# Patient Record
Sex: MALE | Race: WHITE | NOT HISPANIC OR LATINO | Employment: FULL TIME | URBAN - METROPOLITAN AREA
[De-identification: names, ages, dates, MRNs, and addresses within clinical notes are randomized per-mention and may not be internally consistent; named-entity substitution may affect disease eponyms.]

---

## 2023-09-17 PROBLEM — E11.41: Status: ACTIVE | Noted: 2023-09-17

## 2023-09-17 PROBLEM — E55.9 VITAMIN D DEFICIENCY: Status: ACTIVE | Noted: 2023-09-17

## 2023-09-17 PROBLEM — F32.A DEPRESSED: Status: ACTIVE | Noted: 2023-09-17

## 2023-09-17 PROBLEM — G62.9 NEUROPATHY: Status: ACTIVE | Noted: 2023-09-17

## 2023-09-17 PROBLEM — M79.643 HAND PAIN: Status: ACTIVE | Noted: 2023-09-17

## 2023-09-17 PROBLEM — H43.10 VITREOUS HEMORRHAGE (MULTI): Status: ACTIVE | Noted: 2023-09-17

## 2023-09-17 PROBLEM — S00.81XA ABRASION OF FACE: Status: ACTIVE | Noted: 2023-09-17

## 2023-09-17 PROBLEM — G47.9 DIFFICULTY SLEEPING: Status: ACTIVE | Noted: 2023-09-17

## 2023-09-17 PROBLEM — I25.10 ARTERIOSCLEROSIS OF CORONARY ARTERY: Status: ACTIVE | Noted: 2023-09-17

## 2023-09-17 PROBLEM — R60.9 EDEMA: Status: ACTIVE | Noted: 2023-09-17

## 2023-09-17 PROBLEM — S09.90XA INJURY OF HEAD: Status: ACTIVE | Noted: 2023-09-17

## 2023-09-17 PROBLEM — E78.00 HYPERCHOLESTEREMIA: Status: ACTIVE | Noted: 2023-09-17

## 2023-09-17 PROBLEM — J45.909 ASTHMA (HHS-HCC): Status: ACTIVE | Noted: 2023-09-17

## 2023-09-17 PROBLEM — M25.539 PAIN IN WRIST: Status: ACTIVE | Noted: 2023-09-17

## 2023-09-17 PROBLEM — H54.40 BLINDNESS OF LEFT EYE: Status: ACTIVE | Noted: 2023-09-17

## 2023-09-17 PROBLEM — L40.9 PSORIASIS: Status: ACTIVE | Noted: 2023-09-17

## 2023-09-17 PROBLEM — Z95.1 HISTORY OF CORONARY ARTERY BYPASS GRAFT: Status: ACTIVE | Noted: 2023-09-17

## 2023-09-17 PROBLEM — M79.602 PAIN OF LEFT UPPER EXTREMITY: Status: ACTIVE | Noted: 2023-09-17

## 2023-09-17 PROBLEM — M25.519 SHOULDER JOINT PAIN: Status: ACTIVE | Noted: 2023-09-17

## 2023-09-17 PROBLEM — E11.9 TYPE 2 DIABETES MELLITUS WITHOUT COMPLICATION (MULTI): Status: ACTIVE | Noted: 2023-09-17

## 2023-09-17 PROBLEM — I10 HYPERTENSION: Status: ACTIVE | Noted: 2023-09-17

## 2023-09-17 RX ORDER — TALC
6 POWDER (GRAM) TOPICAL NIGHTLY PRN
COMMUNITY
Start: 2019-12-03 | End: 2023-11-13 | Stop reason: WASHOUT

## 2023-09-17 RX ORDER — METOPROLOL SUCCINATE 100 MG/1
1 TABLET, EXTENDED RELEASE ORAL DAILY
COMMUNITY
End: 2023-11-13 | Stop reason: SDUPTHER

## 2023-09-17 RX ORDER — INSULIN GLARGINE 100 [IU]/ML
30 INJECTION, SOLUTION SUBCUTANEOUS DAILY
COMMUNITY
Start: 2021-11-03 | End: 2023-11-10 | Stop reason: SDUPTHER

## 2023-09-17 RX ORDER — APREMILAST 30 MG/1
30 TABLET, FILM COATED ORAL 2 TIMES DAILY
COMMUNITY
End: 2023-11-13 | Stop reason: WASHOUT

## 2023-09-17 RX ORDER — TRIAMCINOLONE ACETONIDE 1 MG/G
1 CREAM TOPICAL 2 TIMES DAILY
COMMUNITY
Start: 2022-06-14 | End: 2023-11-13 | Stop reason: WASHOUT

## 2023-09-17 RX ORDER — TIZANIDINE 2 MG/1
2 TABLET ORAL EVERY 8 HOURS PRN
COMMUNITY
Start: 2019-12-03 | End: 2023-11-13 | Stop reason: WASHOUT

## 2023-09-17 RX ORDER — DULAGLUTIDE 1.5 MG/.5ML
INJECTION, SOLUTION SUBCUTANEOUS
COMMUNITY
Start: 2021-11-03 | End: 2023-11-13 | Stop reason: WASHOUT

## 2023-09-17 RX ORDER — GABAPENTIN 300 MG/1
1 CAPSULE ORAL DAILY
COMMUNITY
Start: 2022-09-06 | End: 2023-11-13 | Stop reason: SDUPTHER

## 2023-09-17 RX ORDER — PEN NEEDLE, DIABETIC 30 GX3/16"
NEEDLE, DISPOSABLE MISCELLANEOUS DAILY
COMMUNITY
Start: 2021-11-03

## 2023-09-17 RX ORDER — ASPIRIN 81 MG/1
1 TABLET ORAL DAILY
COMMUNITY

## 2023-09-17 RX ORDER — HUMAN INSULIN 100 [IU]/ML
20-30 INJECTION, SOLUTION SUBCUTANEOUS 3 TIMES DAILY
COMMUNITY
End: 2023-11-13 | Stop reason: WASHOUT

## 2023-09-17 RX ORDER — METFORMIN HYDROCHLORIDE 500 MG/1
1000 TABLET, EXTENDED RELEASE ORAL 2 TIMES DAILY
COMMUNITY
Start: 2021-11-03 | End: 2023-11-13 | Stop reason: WASHOUT

## 2023-09-17 RX ORDER — HUMAN INSULIN 100 [IU]/ML
32 INJECTION, SUSPENSION SUBCUTANEOUS 2 TIMES DAILY
COMMUNITY
End: 2023-11-13 | Stop reason: WASHOUT

## 2023-09-17 RX ORDER — NITROGLYCERIN 0.4 MG/1
0.4 TABLET SUBLINGUAL EVERY 5 MIN PRN
COMMUNITY
End: 2023-11-13 | Stop reason: WASHOUT

## 2023-09-17 RX ORDER — ALBUTEROL SULFATE 0.83 MG/ML
2.5 SOLUTION RESPIRATORY (INHALATION) 3 TIMES DAILY PRN
COMMUNITY
Start: 2020-03-09 | End: 2024-05-22 | Stop reason: HOSPADM

## 2023-09-17 RX ORDER — SERTRALINE HYDROCHLORIDE 100 MG/1
100 TABLET, FILM COATED ORAL DAILY
COMMUNITY
Start: 2022-12-07 | End: 2023-10-19 | Stop reason: WASHOUT

## 2023-09-17 RX ORDER — CLOTRIMAZOLE AND BETAMETHASONE DIPROPIONATE 10; .64 MG/G; MG/G
1 CREAM TOPICAL 2 TIMES DAILY
COMMUNITY
Start: 2022-06-14 | End: 2023-11-13 | Stop reason: WASHOUT

## 2023-09-17 RX ORDER — ACETAMINOPHEN 325 MG/1
650 TABLET ORAL EVERY 6 HOURS PRN
COMMUNITY
Start: 2019-11-27 | End: 2023-11-13 | Stop reason: SDUPTHER

## 2023-09-17 RX ORDER — METOPROLOL TARTRATE 50 MG/1
50 TABLET ORAL 2 TIMES DAILY
COMMUNITY
Start: 2019-11-27 | End: 2023-11-13 | Stop reason: WASHOUT

## 2023-09-17 RX ORDER — ATORVASTATIN CALCIUM 80 MG/1
80 TABLET, FILM COATED ORAL DAILY
COMMUNITY
Start: 2019-11-27 | End: 2023-11-13 | Stop reason: SDUPTHER

## 2023-09-17 RX ORDER — CALCIPOTRIENE 50 UG/G
1 CREAM TOPICAL 2 TIMES DAILY
COMMUNITY
Start: 2020-05-28 | End: 2023-11-13 | Stop reason: WASHOUT

## 2023-09-17 RX ORDER — LOSARTAN POTASSIUM 50 MG/1
75 TABLET ORAL
COMMUNITY
End: 2023-11-13 | Stop reason: SDUPTHER

## 2023-09-17 RX ORDER — AMOXICILLIN 250 MG
2 CAPSULE ORAL 2 TIMES DAILY PRN
COMMUNITY
Start: 2019-12-03 | End: 2023-11-13 | Stop reason: WASHOUT

## 2023-09-17 RX ORDER — LORATADINE 10 MG/1
1 TABLET ORAL DAILY
COMMUNITY
End: 2023-11-13 | Stop reason: WASHOUT

## 2023-10-02 ENCOUNTER — PHARMACY VISIT (OUTPATIENT)
Dept: PHARMACY | Facility: CLINIC | Age: 58
End: 2023-10-02
Payer: COMMERCIAL

## 2023-10-19 ENCOUNTER — PHARMACY VISIT (OUTPATIENT)
Dept: PHARMACY | Facility: CLINIC | Age: 58
End: 2023-10-19
Payer: COMMERCIAL

## 2023-10-19 PROCEDURE — RXMED WILLOW AMBULATORY MEDICATION CHARGE

## 2023-10-23 RX ORDER — LOSARTAN POTASSIUM 50 MG/1
TABLET ORAL
Qty: 135 TABLET | Refills: 0 | Status: CANCELLED | OUTPATIENT
Start: 2023-10-23 | End: 2024-10-22

## 2023-10-23 RX ORDER — METOPROLOL SUCCINATE 100 MG/1
100 TABLET, EXTENDED RELEASE ORAL DAILY
Qty: 90 TABLET | Refills: 0 | Status: CANCELLED | OUTPATIENT
Start: 2023-10-23 | End: 2024-10-22

## 2023-10-23 RX ORDER — GABAPENTIN 300 MG/1
300 CAPSULE ORAL DAILY
Qty: 30 CAPSULE | Refills: 6 | Status: CANCELLED | OUTPATIENT
Start: 2023-10-23 | End: 2024-10-22

## 2023-11-06 ENCOUNTER — PHARMACY VISIT (OUTPATIENT)
Dept: PHARMACY | Facility: CLINIC | Age: 58
End: 2023-11-06
Payer: COMMERCIAL

## 2023-11-06 PROCEDURE — RXMED WILLOW AMBULATORY MEDICATION CHARGE

## 2023-11-07 ENCOUNTER — TELEPHONE (OUTPATIENT)
Dept: PRIMARY CARE | Facility: CLINIC | Age: 58
End: 2023-11-07
Payer: COMMERCIAL

## 2023-11-10 ENCOUNTER — PHARMACY VISIT (OUTPATIENT)
Dept: PHARMACY | Facility: CLINIC | Age: 58
End: 2023-11-10
Payer: COMMERCIAL

## 2023-11-10 DIAGNOSIS — E11.9 TYPE 2 DIABETES MELLITUS WITHOUT COMPLICATION, UNSPECIFIED WHETHER LONG TERM INSULIN USE (MULTI): ICD-10-CM

## 2023-11-10 PROCEDURE — RXMED WILLOW AMBULATORY MEDICATION CHARGE

## 2023-11-10 RX ORDER — INSULIN GLARGINE 100 [IU]/ML
50 INJECTION, SOLUTION SUBCUTANEOUS NIGHTLY
Qty: 45 ML | Refills: 1 | Status: SHIPPED | OUTPATIENT
Start: 2023-11-10 | End: 2024-05-22 | Stop reason: HOSPADM

## 2023-11-13 ENCOUNTER — CLINICAL SUPPORT (OUTPATIENT)
Dept: PRIMARY CARE | Facility: CLINIC | Age: 58
End: 2023-11-13
Payer: COMMERCIAL

## 2023-11-13 VITALS
HEIGHT: 68 IN | RESPIRATION RATE: 18 BRPM | WEIGHT: 222 LBS | HEART RATE: 70 BPM | OXYGEN SATURATION: 97 % | DIASTOLIC BLOOD PRESSURE: 80 MMHG | BODY MASS INDEX: 33.65 KG/M2 | SYSTOLIC BLOOD PRESSURE: 136 MMHG | TEMPERATURE: 98.4 F

## 2023-11-13 DIAGNOSIS — E11.9 TYPE 2 DIABETES MELLITUS WITHOUT COMPLICATION, WITH LONG-TERM CURRENT USE OF INSULIN (MULTI): Primary | ICD-10-CM

## 2023-11-13 DIAGNOSIS — Z79.4 TYPE 2 DIABETES MELLITUS WITHOUT COMPLICATION, WITH LONG-TERM CURRENT USE OF INSULIN (MULTI): Primary | ICD-10-CM

## 2023-11-13 DIAGNOSIS — L40.9 PSORIASIS: ICD-10-CM

## 2023-11-13 DIAGNOSIS — E55.9 VITAMIN D DEFICIENCY: ICD-10-CM

## 2023-11-13 DIAGNOSIS — Z12.5 ENCOUNTER FOR PROSTATE CANCER SCREENING: ICD-10-CM

## 2023-11-13 DIAGNOSIS — R53.83 OTHER FATIGUE: ICD-10-CM

## 2023-11-13 DIAGNOSIS — F41.9 ANXIETY: ICD-10-CM

## 2023-11-13 DIAGNOSIS — Z13.220 SCREENING FOR LIPID DISORDERS: ICD-10-CM

## 2023-11-13 LAB — POC HEMOGLOBIN A1C: 6.8 % (ref 4.2–6.5)

## 2023-11-13 PROCEDURE — 83036 HEMOGLOBIN GLYCOSYLATED A1C: CPT | Mod: QW | Performed by: PHARMACIST

## 2023-11-13 PROCEDURE — 95251 CONT GLUC MNTR ANALYSIS I&R: CPT | Performed by: PHARMACIST

## 2023-11-13 PROCEDURE — 99214 OFFICE O/P EST MOD 30 MIN: CPT | Performed by: PHARMACIST

## 2023-11-13 ASSESSMENT — LIFESTYLE VARIABLES
HOW OFTEN DO YOU HAVE A DRINK CONTAINING ALCOHOL: 2-4 TIMES A MONTH
SKIP TO QUESTIONS 9-10: 1
HOW OFTEN DO YOU HAVE SIX OR MORE DRINKS ON ONE OCCASION: NEVER
HOW MANY STANDARD DRINKS CONTAINING ALCOHOL DO YOU HAVE ON A TYPICAL DAY: 1 OR 2
AUDIT-C TOTAL SCORE: 2

## 2023-11-13 ASSESSMENT — PATIENT HEALTH QUESTIONNAIRE - PHQ9
SUM OF ALL RESPONSES TO PHQ9 QUESTIONS 1 AND 2: 0
1. LITTLE INTEREST OR PLEASURE IN DOING THINGS: NOT AT ALL
2. FEELING DOWN, DEPRESSED OR HOPELESS: NOT AT ALL

## 2023-11-13 ASSESSMENT — PAIN SCALES - GENERAL: PAINLEVEL: 0-NO PAIN

## 2023-11-13 NOTE — PROGRESS NOTES
"Pharmacist Clinic: Diabetes Management    Josué Pastor is a 58 y.o. male who presents for a follow-up evaluation of his Type 2 diabetes mellitus.     Referring Provider: Mague Dutton CNP    Subjective   Pt reports for a diabetic check, reports no other issues at this time    Objective   /80   Pulse 70   Temp 36.9 °C (98.4 °F)   Resp 18   Ht 1.727 m (5' 8\")   Wt 101 kg (222 lb)   SpO2 97%   BMI 33.75 kg/m²     LAB REVIEW   Lab Results   Component Value Date    HGBA1C 6.8 (A) 11/13/2023     Lab Results   Component Value Date    GLUCOSE 147 (H) 05/16/2022    CREATININE 0.8 05/16/2022    EGFR 103 05/16/2022     Lab Results   Component Value Date    CREATININE 0.8 05/16/2022     No results found for: \"CHOL\"  No results found for: \"HDL\"  No results found for: \"LDLCALC\"  No results found for: \"TRIG\"      CURRENT PHARMACOTHERAPY  Current Outpatient Medications on File Prior to Visit   Medication Sig Dispense Refill    albuterol 2.5 mg /3 mL (0.083 %) nebulizer solution Take 3 mL (2.5 mg) by nebulization 3 times a day as needed.      aspirin 81 mg EC tablet Take 1 tablet (81 mg) by mouth once daily.      atorvastatin (Lipitor) 80 mg tablet TAKE 1 TABLET BY MOUTH ONE TIME DAILY 90 tablet 1    dulaglutide 3 mg/0.5 mL pen injector INJECT 1 PEN-INJECTOR UNDER THE SKIN ONCE EVERY WEEK 2 mL 5    FreeStyle Nidhi sensor system kit USE TO MONITOR BLOOD SUGAR AS DIRECTED EVERY 14 DAYS 2 each 5    gabapentin (Neurontin) 300 mg capsule TAKE 1 CAPSULE BY MOUTH ONE TIME DAILY 30 capsule 6    insulin glargine (Lantus Solostar U-100 Insulin) 100 unit/mL (3 mL) pen Inject 50 Units under the skin once daily at bedtime. max of 50 units/day subq Daily (Patient taking differently: Inject 30 Units under the skin once daily at bedtime. max of 50 units/day subq Daily) 45 mL 1    losartan (Cozaar) 50 mg tablet TAKE 1.5 TABLETS BY MOUTH ONE TIME DAILY 135 tablet 0    metFORMIN XR (Glucophage-XR) 500 mg 24 hr tablet TAKE 2 TABLETS " "BY MOUTH TWO TIMES A DAY. 360 tablet 1    metoprolol succinate XL (Toprol-XL) 100 mg 24 hr tablet TAKE 1 TABLET BY MOUTH ONE TIME DAILY 90 tablet 0    pen needle, diabetic 31 gauge x 3/16\" needle Inject under the skin once daily.      sertraline (Zoloft) 100 mg tablet TAKE 1 TABLET BY MOUTH 1 TIME DAILY 90 tablet 1    [DISCONTINUED] acetaminophen (Tylenol) 325 mg tablet Take 2 tablets (650 mg) by mouth every 6 hours if needed.      [DISCONTINUED] acetaminophen (Tylenol) 500 mg tablet TAKE 2 TABLETS BY MOUTH EVERY 8 HOURS AS NEEDED (Patient not taking: Reported on 11/13/2023) 180 tablet 1    [DISCONTINUED] apremilast (Otezla) 30 mg tablet Take 1 tablet (30 mg) by mouth twice a day.      [DISCONTINUED] atorvastatin (Lipitor) 80 mg tablet Take 1 tablet (80 mg) by mouth once daily.      [DISCONTINUED] calcipotriene (Dovonex) 0.005 % cream Apply 1 Application topically 2 times a day.      [DISCONTINUED] clotrimazole-betamethasone (Lotrisone) cream Apply 1 Application topically 2 times a day.      [DISCONTINUED] cyclopentolate (Cyclogyl) 1 % ophthalmic solution PLACE 1 DROP IN THE RIGHT EYE THREE TIMES A DAY AS DIRECTED (Patient not taking: Reported on 11/13/2023) 15 mL 1    [DISCONTINUED] dulaglutide (Trulicity) 1.5 mg/0.5 mL pen injector injection Inject under the skin 1 (one) time per week.      [DISCONTINUED] dulaglutide (Trulicity) 1.5 mg/0.5 mL pen injector injection INJECT 1 PEN-INJECTOR UNDER THE SKIN ONCE EVERY WEEK (Patient not taking: Reported on 11/13/2023) 6 mL 1    [DISCONTINUED] gabapentin (Neurontin) 300 mg capsule Take 1 capsule (300 mg) by mouth once daily.      [DISCONTINUED] insulin glargine (Lantus Solostar U-100 Insulin) 100 unit/mL (3 mL) pen Inject 30 Units under the skin once daily. max of 50 units/day subq Daily      [DISCONTINUED] insulin NPH, Isophane, (NovoLIN N NPH U-100 Insulin) 100 unit/mL injection Inject 32 Units under the skin twice a day.      [DISCONTINUED] insulin regular (NovoLIN R " Regular U100 Insulin) 100 unit/mL injection Inject 20-30 Units under the skin 3 times a day.      [DISCONTINUED] loratadine (Claritin) 10 mg tablet Take 1 tablet (10 mg) by mouth once daily.      [DISCONTINUED] losartan (Cozaar) 50 mg tablet Take 1.5 tablets (75 mg) by mouth.      [DISCONTINUED] melatonin 3 mg tablet Take 2 tablets (6 mg) by mouth as needed at bedtime for sleep.      [DISCONTINUED] metFORMIN  mg 24 hr tablet Take 2 tablets (1,000 mg) by mouth 2 times a day.      [DISCONTINUED] methylPREDNISolone (Medrol Dospak) 4 mg tablets TAKE 6 TABLETS BY MOUTH ON DAY #1, 5 TABLETS ON DAY #2, 4 TABLETS ON DAY #3, 3 TABLETS ON DAY #4, 2 TABLETS ON DAY #5, 1 TABLET ON DAY #6, THEN STOP (Patient not taking: Reported on 11/13/2023) 21 each 0    [DISCONTINUED] metoprolol succinate XL (Toprol-XL) 100 mg 24 hr tablet Take 1 tablet (100 mg) by mouth once daily.      [DISCONTINUED] metoprolol tartrate (Lopressor) 50 mg tablet Take 1 tablet by mouth 2 times a day.      [DISCONTINUED] nitroglycerin (Nitrostat) 0.4 mg SL tablet Place 1 tablet (0.4 mg) under the tongue every 5 minutes if needed for chest pain (UP TO 3 DOSES AS NEEDED FOR CHEST PAIN.).      [DISCONTINUED] prednisoLONE acetate (Pred-Forte) 1 % ophthalmic suspension INSTILL 1 DROP IN THE RIGHT EYE 4 TIMES A DAY (Patient not taking: Reported on 11/13/2023) 10 mL 2    [DISCONTINUED] prednisoLONE acetate (Pred-Forte) 1 % ophthalmic suspension INSTILL 1 DROP IN THE RIGHT EYE 4 TIMES A DAY (Patient not taking: Reported on 11/13/2023) 10 mL 2    [DISCONTINUED] sennosides-docusate sodium (Ledy-Colace) 8.6-50 mg tablet Take 2 tablets by mouth 2 times a day as needed for constipation.      [DISCONTINUED] tiZANidine (Zanaflex) 2 mg tablet Take 1 tablet (2 mg) by mouth every 8 hours if needed for muscle spasms.      [DISCONTINUED] tobramycin (Tobrex) 0.3 % ophthalmic solution INSTILL 1 DROP IN THE RIGHT EYE 4 TIMES A DAY (Patient not taking: Reported on 11/13/2023)  10 mL 2    [DISCONTINUED] tobramycin (Tobrex) 0.3 % ophthalmic solution INSTILL 1 DROP IN THE RIGHT EYE 4 TIMES A DAY (Patient not taking: Reported on 11/13/2023) 10 mL 2    [DISCONTINUED] triamcinolone (Kenalog) 0.1 % cream Apply 1 Application topically 2 times a day.       No current facility-administered medications on file prior to visit.       SECONDARY PREVENTION  - Statin? Yes  - ACE-I/ARB? No  - Aspirin? Yes  - Last eye exam: Goes regularly for injections  - Retinopathy present?: Yes  - Last diabetic foot exam: 05/12/23    No Known Allergies    SMBG MEASUREMENTS  Targets reviewed    LIFESTYLE  Watching diet,  limits carbs  - increase in fruits and veggies  Does Le[employer] Cuisine for dinner  Works second shift so will have leftovers in the evening  Very active at job, walks at least 10,000 steps per day    CGM DEVICE/REPORT FINDINGS  Device: RACHELLE 2     Patient does not report symptoms of hypoglycemia.  Hypoglycemia NOTED on CGM report - overnight, compression lows. Denies being symptomatic, will double check with finger poke  Reviewed symptoms, treatment and prevention  Advised carrying glucose source at all times    2,   Patient does not report symptoms of hyperglycemia. Hyperglycemia NOT NOTED  on CGM report.    CGM wear time 28 days   % Active 32%   Avg Glucose 147   GMI N/a   Variability 28.6%   TAR 20%   TIR 80%   TBR 0%   Reports reviewed with patient  See scanned documents  Not scanning consistently  - discussed changing to Rachelle 3 once handheld  available at pharmacies    RECOMMENDATIONS/PLAN  Pt diabetes is well controlled with most recent A1c of 6.8% (goal < 7 %).   A1c decreased 0.4% since last visit / 3 months  Limited CGM data to evaluate, FBS typically within target range  No medication changes    Assessment/Plan   Problem List Items Addressed This Visit             ICD-10-CM    Psoriasis L40.9    Relevant Orders    Referral to Dermatology    Type 2 diabetes mellitus without complication  (CMS/[facility]) - Primary E11.9     Continue Trulicity, metformin and Lantus 30 units         Relevant Orders    POCT glycosylated hemoglobin (Hb A1C) manually resulted (Completed)    Albumin, urine, random    Comprehensive metabolic panel    Vitamin D deficiency E55.9    Relevant Orders    Vitamin D 25-Hydroxy,Total (for eval of Vitamin D levels)     Other Visit Diagnoses         Codes    Screening for lipid disorders     Z13.220    Relevant Orders    Lipid Panel    Encounter for prostate cancer screening     Z12.5    Relevant Orders    PSA, total and free    Other fatigue     R53.83    Relevant Orders    CBC and Auto Differential    TSH with reflex to Free T4 if abnormal    Anxiety     F41.9            Clinical Pharmacist follow up: 3 months    Treatment and plan discussed with Mague Dutton CNP    Verbal consent to manage patient's drug therapy was obtained from the patient or an individual authorized to act on behalf of the patient.  Patient was informed they may decline to participate or withdraw from participation in pharmacy services at any time.

## 2023-11-13 NOTE — PROGRESS NOTES
"Subjective   Patient ID: Josué Pastor is a 58 y.o. male who presents for Diabetes.    HERE FOR DM FOLLOW UP DOING WELL FEELING MUCH BETTER. EYE EXAM UP TO DATE BLIND IN R EYE. LEFT EYE MUCH BETTER HAD LAZOR SURGERY , WORKING  FEELING MUCH BETTER ZOLOFT REALLY HELPS     Diabetes  He presents for his follow-up diabetic visit. His disease course has been improving. Diabetic complications include retinopathy. Risk factors for coronary artery disease include diabetes mellitus, male sex and stress. He is compliant with treatment all of the time. He is following a generally healthy diet.        Review of Systems    Objective   /80   Pulse 70   Temp 36.9 °C (98.4 °F)   Resp 18   Ht 1.727 m (5' 8\")   Wt 101 kg (222 lb)   SpO2 97%   BMI 33.75 kg/m²     Physical Exam    Assessment/Plan   Problem List Items Addressed This Visit             ICD-10-CM    Type 2 diabetes mellitus without complication (CMS/HCC) - Primary E11.9    Relevant Orders    POCT glycosylated hemoglobin (Hb A1C) manually resulted (Completed)          "

## 2023-11-13 NOTE — PATIENT INSTRUCTIONS
DOING GREAT. SO HAPPY  FOR  HIM    Scan Nidhi often - when you wake up, before/after meals, bedtime, etc. Do not go more than 8 hours between scans Follow scans closely, learning from scans which meals/snacks cause higher blood sugars

## 2023-11-20 LAB
ALANINE AMINOTRANSFERASE (SGPT) (U/L) IN SER/PLAS EXTERNAL: 18 U/L
ALBUMIN (G/DL) IN SER/PLAS EXTERNAL: 4.3 G/DL
ALBUMIN/CREATININE (UG/MG) IN URINE EXTERNAL: 5 UG/MG CREAT
ALKALINE PHOSPHATASE (U/L) IN SER/PLAS EXTERNAL: 91 U/L
ASPARTATE AMINOTRANSFERASE (SGOT) (U/L) IN SER/PLAS EXTERNAL: 18 U/L
BILIRUBIN TOTAL (MG/DL) IN SER/PLAS EXTERNAL: 0.3 MG/DL
CALCIDIOL (25 OH VITAMIN D3) (NG/ML) IN SER/PLAS EXTERNAL: 35 NG/ML
CALCIUM (MG/DL) IN SER/PLAS EXTERNAL: 9.1 MG/DL
CARBON DIOXIDE, TOTAL (MMOL/L) IN SER/PLAS EXTERNAL: 23 MMOL/L
CHLORIDE (MMOL/L) IN SER/PLAS EXTERNAL: 104 MMOL/L
CHOLESTEROL (MG/DL) IN SER/PLAS EXTERNAL: 98 MG/DL
CHOLESTEROL IN HDL (MG/DL) IN SER/PLAS EXTERNAL: 36 MG/DL
CREATININE (MG/DL) IN SER/PLAS EXTERNAL: 0.85 MG/DL
GLOMERULAR FILTRATION RATE ML/MIN/1.73 SQ M.PREDICTED EXTERNAL: 100 ML/MIN/1.73M*2
GLUCOSE (MG/DL) IN SER/PLAS EXTERNAL: 114 MG/DL (ref ?–1)
NON HDL CHOLESTEROL EXTERNAL: 46 MG/DL
POTASSIUM (MMOL/L) IN SER/PLAS EXTERMA;: 4.3 MMOL/L
PROSTATE SPECIFIC AG (NG/ML) IN SER/PLAS EXTERNAL: 0.8 NG/ML
PROTEIN TOTAL EXTERNAL: 6.6 G/DL
SODIUM (MMOL/L) IN SER/PLAS EXTERNAL: 141 MMOL/L
TRIGLYCERIDE (MG/DL) IN SER/PLAS EXTERNAL: 80 MG/DL
UREA NITROGEN (MG/DL) IN SER/PLAS EXTERNAL: 11 MG/DL
VLDL EXTERNAL: 16 MG/DL

## 2023-11-21 ENCOUNTER — PHARMACY VISIT (OUTPATIENT)
Dept: PHARMACY | Facility: CLINIC | Age: 58
End: 2023-11-21
Payer: COMMERCIAL

## 2023-11-21 DIAGNOSIS — I25.810 CAD (CORONARY ARTERY DISEASE) OF ARTERY BYPASS GRAFT: ICD-10-CM

## 2023-11-21 DIAGNOSIS — I25.10 CAD (CORONARY ARTERY DISEASE): ICD-10-CM

## 2023-11-21 DIAGNOSIS — I25.10 ARTERIOSCLEROSIS OF CORONARY ARTERY: ICD-10-CM

## 2023-11-21 DIAGNOSIS — Z95.1 HISTORY OF CORONARY ARTERY BYPASS GRAFT: Primary | ICD-10-CM

## 2023-11-21 PROCEDURE — RXMED WILLOW AMBULATORY MEDICATION CHARGE

## 2023-11-21 RX ORDER — METOPROLOL SUCCINATE 100 MG/1
100 TABLET, EXTENDED RELEASE ORAL DAILY
Qty: 90 TABLET | Refills: 0 | Status: SHIPPED | OUTPATIENT
Start: 2023-11-21 | End: 2024-02-19 | Stop reason: SDUPTHER

## 2023-11-22 ENCOUNTER — PHARMACY VISIT (OUTPATIENT)
Dept: PHARMACY | Facility: CLINIC | Age: 58
End: 2023-11-22
Payer: COMMERCIAL

## 2023-11-22 PROCEDURE — RXMED WILLOW AMBULATORY MEDICATION CHARGE

## 2023-11-28 ENCOUNTER — DOCUMENTATION (OUTPATIENT)
Dept: PRIMARY CARE | Facility: CLINIC | Age: 58
End: 2023-11-28
Payer: COMMERCIAL

## 2023-11-28 PROBLEM — D12.6 TUBULAR ADENOMA OF COLON: Status: ACTIVE | Noted: 2023-11-28

## 2023-11-28 LAB — THYROTROPIN (MIU/L) IN SER/PLAS BY DETECTION LIMIT <= 0.05 MIU/L EXTERNAL: 0.52 MIU/L

## 2023-12-08 PROCEDURE — RXMED WILLOW AMBULATORY MEDICATION CHARGE

## 2023-12-11 ENCOUNTER — PHARMACY VISIT (OUTPATIENT)
Dept: PHARMACY | Facility: CLINIC | Age: 58
End: 2023-12-11
Payer: COMMERCIAL

## 2024-01-02 PROCEDURE — RXMED WILLOW AMBULATORY MEDICATION CHARGE

## 2024-01-02 RX ORDER — ATORVASTATIN CALCIUM 80 MG/1
80 TABLET, FILM COATED ORAL DAILY
Qty: 90 TABLET | Refills: 1 | Status: CANCELLED | OUTPATIENT
Start: 2024-01-02 | End: 2025-01-01

## 2024-01-05 ENCOUNTER — PHARMACY VISIT (OUTPATIENT)
Dept: PHARMACY | Facility: CLINIC | Age: 59
End: 2024-01-05
Payer: COMMERCIAL

## 2024-01-15 PROCEDURE — RXMED WILLOW AMBULATORY MEDICATION CHARGE

## 2024-01-16 ENCOUNTER — PHARMACY VISIT (OUTPATIENT)
Dept: PHARMACY | Facility: CLINIC | Age: 59
End: 2024-01-16
Payer: COMMERCIAL

## 2024-01-16 PROCEDURE — RXMED WILLOW AMBULATORY MEDICATION CHARGE

## 2024-01-16 PROCEDURE — RXOTC WILLOW AMBULATORY OTC CHARGE

## 2024-02-19 DIAGNOSIS — I25.10 CAD (CORONARY ARTERY DISEASE): ICD-10-CM

## 2024-02-19 DIAGNOSIS — I25.10 ARTERIOSCLEROSIS OF CORONARY ARTERY: ICD-10-CM

## 2024-02-19 DIAGNOSIS — Z95.1 HISTORY OF CORONARY ARTERY BYPASS GRAFT: ICD-10-CM

## 2024-02-19 DIAGNOSIS — I25.810 CAD (CORONARY ARTERY DISEASE) OF ARTERY BYPASS GRAFT: ICD-10-CM

## 2024-02-19 PROCEDURE — RXMED WILLOW AMBULATORY MEDICATION CHARGE

## 2024-02-19 RX ORDER — METOPROLOL SUCCINATE 100 MG/1
100 TABLET, EXTENDED RELEASE ORAL DAILY
Qty: 90 TABLET | Refills: 0 | Status: SHIPPED | OUTPATIENT
Start: 2024-02-19 | End: 2024-04-15 | Stop reason: SDUPTHER

## 2024-02-21 ENCOUNTER — PHARMACY VISIT (OUTPATIENT)
Dept: PHARMACY | Facility: CLINIC | Age: 59
End: 2024-02-21
Payer: COMMERCIAL

## 2024-03-25 DIAGNOSIS — E11.65 TYPE 2 DIABETES MELLITUS WITH HYPERGLYCEMIA, UNSPECIFIED WHETHER LONG TERM INSULIN USE (MULTI): Primary | ICD-10-CM

## 2024-03-25 PROCEDURE — RXMED WILLOW AMBULATORY MEDICATION CHARGE

## 2024-03-25 RX ORDER — FLASH GLUCOSE SENSOR
KIT MISCELLANEOUS
Qty: 2 EACH | Refills: 5 | Status: SHIPPED | OUTPATIENT
Start: 2024-03-25 | End: 2025-03-25

## 2024-03-27 ENCOUNTER — PHARMACY VISIT (OUTPATIENT)
Dept: PHARMACY | Facility: CLINIC | Age: 59
End: 2024-03-27
Payer: COMMERCIAL

## 2024-04-02 PROCEDURE — RXMED WILLOW AMBULATORY MEDICATION CHARGE

## 2024-04-04 ENCOUNTER — PHARMACY VISIT (OUTPATIENT)
Dept: PHARMACY | Facility: CLINIC | Age: 59
End: 2024-04-04
Payer: COMMERCIAL

## 2024-04-16 DIAGNOSIS — E11.9 TYPE 2 DIABETES MELLITUS WITHOUT COMPLICATION, UNSPECIFIED WHETHER LONG TERM INSULIN USE (MULTI): ICD-10-CM

## 2024-04-17 PROCEDURE — RXMED WILLOW AMBULATORY MEDICATION CHARGE

## 2024-04-17 RX ORDER — METFORMIN HYDROCHLORIDE 500 MG/1
1000 TABLET, EXTENDED RELEASE ORAL 2 TIMES DAILY
Qty: 360 TABLET | Refills: 1 | Status: SHIPPED | OUTPATIENT
Start: 2024-04-17

## 2024-04-18 DIAGNOSIS — F41.9 ANXIETY: ICD-10-CM

## 2024-04-18 PROCEDURE — RXMED WILLOW AMBULATORY MEDICATION CHARGE

## 2024-04-18 RX ORDER — SERTRALINE HYDROCHLORIDE 100 MG/1
TABLET, FILM COATED ORAL
Qty: 90 TABLET | Refills: 1 | Status: SHIPPED | OUTPATIENT
Start: 2024-04-18 | End: 2024-05-22 | Stop reason: HOSPADM

## 2024-04-19 ENCOUNTER — PHARMACY VISIT (OUTPATIENT)
Dept: PHARMACY | Facility: CLINIC | Age: 59
End: 2024-04-19
Payer: COMMERCIAL

## 2024-04-19 PROCEDURE — RXMED WILLOW AMBULATORY MEDICATION CHARGE

## 2024-04-19 RX ORDER — TRIAMCINOLONE ACETONIDE 1 MG/G
OINTMENT TOPICAL
Qty: 454 G | Refills: 0 | OUTPATIENT
Start: 2024-04-18

## 2024-04-22 PROCEDURE — RXMED WILLOW AMBULATORY MEDICATION CHARGE

## 2024-04-23 ENCOUNTER — PHARMACY VISIT (OUTPATIENT)
Dept: PHARMACY | Facility: CLINIC | Age: 59
End: 2024-04-23
Payer: COMMERCIAL

## 2024-04-29 PROCEDURE — RXMED WILLOW AMBULATORY MEDICATION CHARGE

## 2024-05-02 ENCOUNTER — PHARMACY VISIT (OUTPATIENT)
Dept: PHARMACY | Facility: CLINIC | Age: 59
End: 2024-05-02
Payer: COMMERCIAL

## 2024-05-02 PROCEDURE — RXMED WILLOW AMBULATORY MEDICATION CHARGE

## 2024-05-13 ENCOUNTER — HOSPITAL ENCOUNTER (INPATIENT)
Facility: HOSPITAL | Age: 59
LOS: 9 days | Discharge: HOME | DRG: 885 | End: 2024-05-22
Attending: PSYCHIATRY & NEUROLOGY | Admitting: PSYCHIATRY & NEUROLOGY
Payer: COMMERCIAL

## 2024-05-13 ENCOUNTER — APPOINTMENT (OUTPATIENT)
Dept: RADIOLOGY | Facility: HOSPITAL | Age: 59
End: 2024-05-13
Payer: COMMERCIAL

## 2024-05-13 ENCOUNTER — HOSPITAL ENCOUNTER (EMERGENCY)
Facility: HOSPITAL | Age: 59
Discharge: OTHER INSTITUTION | End: 2024-05-13
Attending: STUDENT IN AN ORGANIZED HEALTH CARE EDUCATION/TRAINING PROGRAM
Payer: COMMERCIAL

## 2024-05-13 ENCOUNTER — APPOINTMENT (OUTPATIENT)
Dept: CARDIOLOGY | Facility: HOSPITAL | Age: 59
End: 2024-05-13
Payer: COMMERCIAL

## 2024-05-13 VITALS
RESPIRATION RATE: 16 BRPM | DIASTOLIC BLOOD PRESSURE: 56 MMHG | HEIGHT: 70 IN | HEART RATE: 57 BPM | SYSTOLIC BLOOD PRESSURE: 128 MMHG | OXYGEN SATURATION: 97 % | BODY MASS INDEX: 30.78 KG/M2 | WEIGHT: 215 LBS | TEMPERATURE: 98.1 F

## 2024-05-13 DIAGNOSIS — R44.0 AUDITORY HALLUCINATIONS: ICD-10-CM

## 2024-05-13 DIAGNOSIS — F32.89 OTHER SPECIFIED DEPRESSIVE DISORDER: Primary | ICD-10-CM

## 2024-05-13 DIAGNOSIS — R45.851 SUICIDAL IDEATION: Primary | ICD-10-CM

## 2024-05-13 DIAGNOSIS — F32.A DEPRESSION WITH SUICIDAL IDEATION: ICD-10-CM

## 2024-05-13 DIAGNOSIS — E11.9 TYPE 2 DIABETES MELLITUS WITHOUT COMPLICATION, UNSPECIFIED WHETHER LONG TERM INSULIN USE (MULTI): ICD-10-CM

## 2024-05-13 DIAGNOSIS — R45.851 DEPRESSION WITH SUICIDAL IDEATION: ICD-10-CM

## 2024-05-13 LAB
ALBUMIN SERPL-MCNC: 4.3 G/DL (ref 3.5–5)
ALP BLD-CCNC: 94 U/L (ref 35–125)
ALT SERPL-CCNC: 24 U/L (ref 5–40)
AMPHETAMINES UR QL SCN>1000 NG/ML: NEGATIVE
ANION GAP SERPL CALC-SCNC: 12 MMOL/L
APAP SERPL-MCNC: <5 UG/ML
APPEARANCE UR: CLEAR
AST SERPL-CCNC: 22 U/L (ref 5–40)
BARBITURATES UR QL SCN>300 NG/ML: NEGATIVE
BASOPHILS # BLD AUTO: 0.04 X10*3/UL (ref 0–0.1)
BASOPHILS NFR BLD AUTO: 0.6 %
BENZODIAZ UR QL SCN>300 NG/ML: NEGATIVE
BILIRUB SERPL-MCNC: 0.3 MG/DL (ref 0.1–1.2)
BILIRUB UR STRIP.AUTO-MCNC: NEGATIVE MG/DL
BUN SERPL-MCNC: 18 MG/DL (ref 8–25)
BZE UR QL SCN>300 NG/ML: NEGATIVE
CALCIUM SERPL-MCNC: 9.3 MG/DL (ref 8.5–10.4)
CANNABINOIDS UR QL SCN>50 NG/ML: NEGATIVE
CHLORIDE SERPL-SCNC: 105 MMOL/L (ref 97–107)
CO2 SERPL-SCNC: 26 MMOL/L (ref 24–31)
COLOR UR: NORMAL
CREAT SERPL-MCNC: 0.8 MG/DL (ref 0.4–1.6)
EGFRCR SERPLBLD CKD-EPI 2021: >90 ML/MIN/1.73M*2
EOSINOPHIL # BLD AUTO: 0.21 X10*3/UL (ref 0–0.7)
EOSINOPHIL NFR BLD AUTO: 3.1 %
ERYTHROCYTE [DISTWIDTH] IN BLOOD BY AUTOMATED COUNT: 12.4 % (ref 11.5–14.5)
ETHANOL SERPL-MCNC: <0.01 G/DL
FENTANYL+NORFENTANYL UR QL SCN: NEGATIVE
GLUCOSE BLD MANUAL STRIP-MCNC: 127 MG/DL (ref 74–99)
GLUCOSE BLD MANUAL STRIP-MCNC: 153 MG/DL (ref 74–99)
GLUCOSE SERPL-MCNC: 161 MG/DL (ref 65–99)
GLUCOSE UR STRIP.AUTO-MCNC: NORMAL MG/DL
HCT VFR BLD AUTO: 38.4 % (ref 41–52)
HGB BLD-MCNC: 13 G/DL (ref 13.5–17.5)
IMM GRANULOCYTES # BLD AUTO: 0.02 X10*3/UL (ref 0–0.7)
IMM GRANULOCYTES NFR BLD AUTO: 0.3 % (ref 0–0.9)
KETONES UR STRIP.AUTO-MCNC: NEGATIVE MG/DL
LEUKOCYTE ESTERASE UR QL STRIP.AUTO: NEGATIVE
LYMPHOCYTES # BLD AUTO: 1.78 X10*3/UL (ref 1.2–4.8)
LYMPHOCYTES NFR BLD AUTO: 26.6 %
MCH RBC QN AUTO: 29.7 PG (ref 26–34)
MCHC RBC AUTO-ENTMCNC: 33.9 G/DL (ref 32–36)
MCV RBC AUTO: 88 FL (ref 80–100)
METHADONE UR QL SCN>300 NG/ML: NEGATIVE
MONOCYTES # BLD AUTO: 0.57 X10*3/UL (ref 0.1–1)
MONOCYTES NFR BLD AUTO: 8.5 %
NEUTROPHILS # BLD AUTO: 4.06 X10*3/UL (ref 1.2–7.7)
NEUTROPHILS NFR BLD AUTO: 60.9 %
NITRITE UR QL STRIP.AUTO: NEGATIVE
NRBC BLD-RTO: 0 /100 WBCS (ref 0–0)
OPIATES UR QL SCN>300 NG/ML: NEGATIVE
OXYCODONE UR QL: NEGATIVE
PCP UR QL SCN>25 NG/ML: NEGATIVE
PH UR STRIP.AUTO: 6 [PH]
PLATELET # BLD AUTO: 198 X10*3/UL (ref 150–450)
POTASSIUM SERPL-SCNC: 5 MMOL/L (ref 3.4–5.1)
PROT SERPL-MCNC: 7 G/DL (ref 5.9–7.9)
PROT UR STRIP.AUTO-MCNC: NORMAL MG/DL
RBC # BLD AUTO: 4.37 X10*6/UL (ref 4.5–5.9)
RBC # UR STRIP.AUTO: NEGATIVE /UL
RBC #/AREA URNS AUTO: NORMAL /HPF
SALICYLATES SERPL-MCNC: <0 MG/DL
SARS-COV-2 RNA RESP QL NAA+PROBE: NOT DETECTED
SODIUM SERPL-SCNC: 143 MMOL/L (ref 133–145)
SP GR UR STRIP.AUTO: 1.02
UROBILINOGEN UR STRIP.AUTO-MCNC: NORMAL MG/DL
WBC # BLD AUTO: 6.7 X10*3/UL (ref 4.4–11.3)
WBC #/AREA URNS AUTO: NORMAL /HPF

## 2024-05-13 PROCEDURE — 82947 ASSAY GLUCOSE BLOOD QUANT: CPT | Mod: 59

## 2024-05-13 PROCEDURE — 93005 ELECTROCARDIOGRAM TRACING: CPT

## 2024-05-13 PROCEDURE — 2500000001 HC RX 250 WO HCPCS SELF ADMINISTERED DRUGS (ALT 637 FOR MEDICARE OP): Performed by: STUDENT IN AN ORGANIZED HEALTH CARE EDUCATION/TRAINING PROGRAM

## 2024-05-13 PROCEDURE — 90839 PSYTX CRISIS INITIAL 60 MIN: CPT

## 2024-05-13 PROCEDURE — 1240000001 HC SEMI-PRIVATE BH ROOM DAILY

## 2024-05-13 PROCEDURE — 99285 EMERGENCY DEPT VISIT HI MDM: CPT | Mod: 25

## 2024-05-13 PROCEDURE — 80307 DRUG TEST PRSMV CHEM ANLYZR: CPT | Performed by: STUDENT IN AN ORGANIZED HEALTH CARE EDUCATION/TRAINING PROGRAM

## 2024-05-13 PROCEDURE — 70450 CT HEAD/BRAIN W/O DYE: CPT

## 2024-05-13 PROCEDURE — 2500000006 HC RX 250 W HCPCS SELF ADMINISTERED DRUGS (ALT 637 FOR ALL PAYERS): Performed by: PSYCHIATRY & NEUROLOGY

## 2024-05-13 PROCEDURE — 2500000001 HC RX 250 WO HCPCS SELF ADMINISTERED DRUGS (ALT 637 FOR MEDICARE OP): Performed by: PSYCHIATRY & NEUROLOGY

## 2024-05-13 PROCEDURE — 87635 SARS-COV-2 COVID-19 AMP PRB: CPT | Performed by: STUDENT IN AN ORGANIZED HEALTH CARE EDUCATION/TRAINING PROGRAM

## 2024-05-13 PROCEDURE — 82947 ASSAY GLUCOSE BLOOD QUANT: CPT

## 2024-05-13 PROCEDURE — 36415 COLL VENOUS BLD VENIPUNCTURE: CPT | Performed by: STUDENT IN AN ORGANIZED HEALTH CARE EDUCATION/TRAINING PROGRAM

## 2024-05-13 PROCEDURE — 70450 CT HEAD/BRAIN W/O DYE: CPT | Performed by: RADIOLOGY

## 2024-05-13 PROCEDURE — 84075 ASSAY ALKALINE PHOSPHATASE: CPT | Performed by: STUDENT IN AN ORGANIZED HEALTH CARE EDUCATION/TRAINING PROGRAM

## 2024-05-13 PROCEDURE — 99222 1ST HOSP IP/OBS MODERATE 55: CPT | Performed by: INTERNAL MEDICINE

## 2024-05-13 PROCEDURE — 85025 COMPLETE CBC W/AUTO DIFF WBC: CPT | Performed by: STUDENT IN AN ORGANIZED HEALTH CARE EDUCATION/TRAINING PROGRAM

## 2024-05-13 PROCEDURE — 80320 DRUG SCREEN QUANTALCOHOLS: CPT | Performed by: STUDENT IN AN ORGANIZED HEALTH CARE EDUCATION/TRAINING PROGRAM

## 2024-05-13 PROCEDURE — 81001 URINALYSIS AUTO W/SCOPE: CPT | Mod: 59 | Performed by: STUDENT IN AN ORGANIZED HEALTH CARE EDUCATION/TRAINING PROGRAM

## 2024-05-13 RX ORDER — METOPROLOL SUCCINATE 25 MG/1
100 TABLET, EXTENDED RELEASE ORAL DAILY
Status: DISCONTINUED | OUTPATIENT
Start: 2024-05-13 | End: 2024-05-13 | Stop reason: HOSPADM

## 2024-05-13 RX ORDER — ACETAMINOPHEN 325 MG/1
650 TABLET ORAL EVERY 4 HOURS PRN
Status: DISCONTINUED | OUTPATIENT
Start: 2024-05-13 | End: 2024-05-22 | Stop reason: HOSPADM

## 2024-05-13 RX ORDER — METOPROLOL SUCCINATE 50 MG/1
100 TABLET, EXTENDED RELEASE ORAL DAILY
Status: DISCONTINUED | OUTPATIENT
Start: 2024-05-13 | End: 2024-05-22 | Stop reason: HOSPADM

## 2024-05-13 RX ORDER — SERTRALINE HYDROCHLORIDE 50 MG/1
50 TABLET, FILM COATED ORAL DAILY
Status: DISCONTINUED | OUTPATIENT
Start: 2024-05-13 | End: 2024-05-14

## 2024-05-13 RX ORDER — ALUMINUM HYDROXIDE, MAGNESIUM HYDROXIDE, AND SIMETHICONE 1200; 120; 1200 MG/30ML; MG/30ML; MG/30ML
30 SUSPENSION ORAL EVERY 6 HOURS PRN
Status: DISCONTINUED | OUTPATIENT
Start: 2024-05-13 | End: 2024-05-22 | Stop reason: HOSPADM

## 2024-05-13 RX ORDER — ACETAMINOPHEN 500 MG
5 TABLET ORAL NIGHTLY PRN
Status: DISCONTINUED | OUTPATIENT
Start: 2024-05-13 | End: 2024-05-22 | Stop reason: HOSPADM

## 2024-05-13 RX ORDER — GABAPENTIN 300 MG/1
300 CAPSULE ORAL DAILY
Status: DISCONTINUED | OUTPATIENT
Start: 2024-05-13 | End: 2024-05-22 | Stop reason: HOSPADM

## 2024-05-13 RX ORDER — METFORMIN HYDROCHLORIDE 500 MG/1
1000 TABLET, EXTENDED RELEASE ORAL 2 TIMES DAILY
Status: DISCONTINUED | OUTPATIENT
Start: 2024-05-13 | End: 2024-05-22 | Stop reason: HOSPADM

## 2024-05-13 RX ORDER — HALOPERIDOL 5 MG/ML
5 INJECTION INTRAMUSCULAR EVERY 6 HOURS PRN
Status: DISCONTINUED | OUTPATIENT
Start: 2024-05-13 | End: 2024-05-22 | Stop reason: HOSPADM

## 2024-05-13 RX ORDER — LOSARTAN POTASSIUM 50 MG/1
75 TABLET ORAL DAILY
Status: DISCONTINUED | OUTPATIENT
Start: 2024-05-13 | End: 2024-05-13 | Stop reason: HOSPADM

## 2024-05-13 RX ORDER — HALOPERIDOL 5 MG/1
5 TABLET ORAL EVERY 6 HOURS PRN
Status: DISCONTINUED | OUTPATIENT
Start: 2024-05-13 | End: 2024-05-22 | Stop reason: HOSPADM

## 2024-05-13 RX ORDER — DIPHENHYDRAMINE HCL 50 MG
50 CAPSULE ORAL EVERY 6 HOURS PRN
Status: DISCONTINUED | OUTPATIENT
Start: 2024-05-13 | End: 2024-05-22 | Stop reason: HOSPADM

## 2024-05-13 RX ORDER — ASPIRIN 81 MG/1
81 TABLET ORAL DAILY
Status: DISCONTINUED | OUTPATIENT
Start: 2024-05-13 | End: 2024-05-22 | Stop reason: HOSPADM

## 2024-05-13 RX ORDER — LORAZEPAM 2 MG/ML
2 INJECTION INTRAMUSCULAR EVERY 6 HOURS PRN
Status: DISCONTINUED | OUTPATIENT
Start: 2024-05-13 | End: 2024-05-22 | Stop reason: HOSPADM

## 2024-05-13 RX ORDER — ATORVASTATIN CALCIUM 80 MG/1
80 TABLET, FILM COATED ORAL DAILY
Status: DISCONTINUED | OUTPATIENT
Start: 2024-05-13 | End: 2024-05-22 | Stop reason: HOSPADM

## 2024-05-13 RX ORDER — HYDROXYZINE PAMOATE 50 MG/1
50 CAPSULE ORAL EVERY 4 HOURS PRN
Status: DISCONTINUED | OUTPATIENT
Start: 2024-05-13 | End: 2024-05-22 | Stop reason: HOSPADM

## 2024-05-13 RX ORDER — TRIAMCINOLONE ACETONIDE 1 MG/G
OINTMENT TOPICAL 2 TIMES DAILY
Status: DISCONTINUED | OUTPATIENT
Start: 2024-05-13 | End: 2024-05-22 | Stop reason: HOSPADM

## 2024-05-13 RX ORDER — LORAZEPAM 1 MG/1
2 TABLET ORAL EVERY 6 HOURS PRN
Status: DISCONTINUED | OUTPATIENT
Start: 2024-05-13 | End: 2024-05-22 | Stop reason: HOSPADM

## 2024-05-13 RX ORDER — DIPHENHYDRAMINE HYDROCHLORIDE 50 MG/ML
50 INJECTION INTRAMUSCULAR; INTRAVENOUS ONCE AS NEEDED
Status: DISCONTINUED | OUTPATIENT
Start: 2024-05-13 | End: 2024-05-22 | Stop reason: HOSPADM

## 2024-05-13 RX ADMIN — ATORVASTATIN CALCIUM 80 MG: 80 TABLET, FILM COATED ORAL at 19:52

## 2024-05-13 RX ADMIN — METFORMIN HYDROCHLORIDE 1000 MG: 500 TABLET, EXTENDED RELEASE ORAL at 20:27

## 2024-05-13 RX ADMIN — METOPROLOL SUCCINATE 100 MG: 50 TABLET, EXTENDED RELEASE ORAL at 20:27

## 2024-05-13 RX ADMIN — TRIAMCINOLONE ACETONIDE 1 APPLICATION: 1 OINTMENT TOPICAL at 21:37

## 2024-05-13 RX ADMIN — GABAPENTIN 300 MG: 300 CAPSULE ORAL at 19:52

## 2024-05-13 RX ADMIN — SERTRALINE HYDROCHLORIDE 50 MG: 50 TABLET ORAL at 19:53

## 2024-05-13 RX ADMIN — ASPIRIN 81 MG: 81 TABLET, COATED ORAL at 19:52

## 2024-05-13 RX ADMIN — LOSARTAN POTASSIUM 75 MG: 50 TABLET, FILM COATED ORAL at 15:22

## 2024-05-13 SDOH — SOCIAL STABILITY: SOCIAL INSECURITY: ARE YOU OR HAVE YOU BEEN THREATENED OR ABUSED PHYSICALLY, EMOTIONALLY, OR SEXUALLY BY ANYONE?: YES

## 2024-05-13 SDOH — HEALTH STABILITY: MENTAL HEALTH: ACTIVE SUICIDAL IDEATION WITH SPECIFIC PLAN AND INTENT (PAST 1 MONTH): YES

## 2024-05-13 SDOH — HEALTH STABILITY: MENTAL HEALTH: EXPERIENCED ANY OF THE FOLLOWING LIFE EVENTS: LIFE-THREATENING ILLNESS OR INJURY

## 2024-05-13 SDOH — SOCIAL STABILITY: SOCIAL INSECURITY: WITHIN THE LAST YEAR, HAVE YOU BEEN HUMILIATED OR EMOTIONALLY ABUSED IN OTHER WAYS BY YOUR PARTNER OR EX-PARTNER?: NO

## 2024-05-13 SDOH — ECONOMIC STABILITY: INCOME INSECURITY: HOW HARD IS IT FOR YOU TO PAY FOR THE VERY BASICS LIKE FOOD, HOUSING, MEDICAL CARE, AND HEATING?: NOT HARD AT ALL

## 2024-05-13 SDOH — HEALTH STABILITY: MENTAL HEALTH
DEPRESSION SYMPTOMS: FEELINGS OF HELPLESSNESS;FEELINGS OF HOPELESSESS;FEELINGS OF WORTHLESSNESS;LOSS OF INTEREST;CHANGE IN ENERGY LEVEL

## 2024-05-13 SDOH — SOCIAL STABILITY: SOCIAL INSECURITY
WITHIN THE LAST YEAR, HAVE TO BEEN RAPED OR FORCED TO HAVE ANY KIND OF SEXUAL ACTIVITY BY YOUR PARTNER OR EX-PARTNER?: NO

## 2024-05-13 SDOH — SOCIAL STABILITY: SOCIAL INSECURITY: HAVE YOU HAD ANY THOUGHTS OF HARMING ANYONE ELSE?: NO

## 2024-05-13 SDOH — SOCIAL STABILITY: SOCIAL INSECURITY: WITHIN THE LAST YEAR, HAVE YOU BEEN AFRAID OF YOUR PARTNER OR EX-PARTNER?: NO

## 2024-05-13 SDOH — HEALTH STABILITY: MENTAL HEALTH: WISH TO BE DEAD (PAST 1 MONTH): YES

## 2024-05-13 SDOH — HEALTH STABILITY: MENTAL HEALTH: SUICIDAL BEHAVIOR (LIFETIME): NO

## 2024-05-13 SDOH — ECONOMIC STABILITY: FOOD INSECURITY: WITHIN THE PAST 12 MONTHS, YOU WORRIED THAT YOUR FOOD WOULD RUN OUT BEFORE YOU GOT MONEY TO BUY MORE.: NEVER TRUE

## 2024-05-13 SDOH — SOCIAL STABILITY: SOCIAL NETWORK: HOW OFTEN DO YOU ATTENT MEETINGS OF THE CLUB OR ORGANIZATION YOU BELONG TO?: NEVER

## 2024-05-13 SDOH — SOCIAL STABILITY: SOCIAL NETWORK: HOW OFTEN DO YOU ATTEND CHURCH OR RELIGIOUS SERVICES?: NEVER

## 2024-05-13 SDOH — HEALTH STABILITY: MENTAL HEALTH
STRESS IS WHEN SOMEONE FEELS TENSE, NERVOUS, ANXIOUS, OR CAN'T SLEEP AT NIGHT BECAUSE THEIR MIND IS TROUBLED. HOW STRESSED ARE YOU?: VERY MUCH

## 2024-05-13 SDOH — ECONOMIC STABILITY: INCOME INSECURITY: IN THE LAST 12 MONTHS, WAS THERE A TIME WHEN YOU WERE NOT ABLE TO PAY THE MORTGAGE OR RENT ON TIME?: NO

## 2024-05-13 SDOH — SOCIAL STABILITY: SOCIAL NETWORK: ARE YOU MARRIED, WIDOWED, DIVORCED, SEPARATED, NEVER MARRIED, OR LIVING WITH A PARTNER?: MARRIED

## 2024-05-13 SDOH — ECONOMIC STABILITY: HOUSING INSECURITY
IN THE LAST 12 MONTHS, WAS THERE A TIME WHEN YOU DID NOT HAVE A STEADY PLACE TO SLEEP OR SLEPT IN A SHELTER (INCLUDING NOW)?: NO

## 2024-05-13 SDOH — HEALTH STABILITY: MENTAL HEALTH
DESCRIBE YOUR THOUGHTS OF KILLING YOURSELF RIGHT NOW:: PT ENDORSING COMMAND HALLUNICATIONS STATING "IT'S TIME". PT DOES NOT SEE A WAY OUT OF THIS.

## 2024-05-13 SDOH — SOCIAL STABILITY: SOCIAL NETWORK
DO YOU BELONG TO ANY CLUBS OR ORGANIZATIONS SUCH AS CHURCH GROUPS UNIONS, FRATERNAL OR ATHLETIC GROUPS, OR SCHOOL GROUPS?: NO

## 2024-05-13 SDOH — SOCIAL STABILITY: SOCIAL INSECURITY: DO YOU FEEL ANYONE HAS EXPLOITED OR TAKEN ADVANTAGE OF YOU FINANCIALLY OR OF YOUR PERSONAL PROPERTY?: NO

## 2024-05-13 SDOH — SOCIAL STABILITY: SOCIAL INSECURITY: WERE YOU ABLE TO COMPLETE ALL THE BEHAVIORAL HEALTH SCREENINGS?: YES

## 2024-05-13 SDOH — SOCIAL STABILITY: SOCIAL INSECURITY: DO YOU FEEL UNSAFE GOING BACK TO THE PLACE WHERE YOU ARE LIVING?: NO

## 2024-05-13 SDOH — SOCIAL STABILITY: SOCIAL INSECURITY: ABUSE: ADULT

## 2024-05-13 SDOH — SOCIAL STABILITY: SOCIAL NETWORK: HOW OFTEN DO YOU GET TOGETHER WITH FRIENDS OR RELATIVES?: NEVER

## 2024-05-13 SDOH — HEALTH STABILITY: MENTAL HEALTH: IN THE PAST FEW WEEKS, HAVE YOU FELT THAT YOU OR YOUR FAMILY WOULD BE BETTER OFF IF YOU WERE DEAD?: YES

## 2024-05-13 SDOH — ECONOMIC STABILITY: TRANSPORTATION INSECURITY
IN THE PAST 12 MONTHS, HAS THE LACK OF TRANSPORTATION KEPT YOU FROM MEDICAL APPOINTMENTS OR FROM GETTING MEDICATIONS?: NO

## 2024-05-13 SDOH — ECONOMIC STABILITY: HOUSING INSECURITY: IN THE LAST 12 MONTHS, HOW MANY PLACES HAVE YOU LIVED?: 1

## 2024-05-13 SDOH — HEALTH STABILITY: MENTAL HEALTH: HAVE YOU EVER TRIED TO KILL YOURSELF?: NO

## 2024-05-13 SDOH — SOCIAL STABILITY: SOCIAL INSECURITY: POSSIBLE ABUSE REPORTED TO:: OTHER (COMMENT)

## 2024-05-13 SDOH — ECONOMIC STABILITY: FOOD INSECURITY: WITHIN THE PAST 12 MONTHS, THE FOOD YOU BOUGHT JUST DIDN'T LAST AND YOU DIDN'T HAVE MONEY TO GET MORE.: NEVER TRUE

## 2024-05-13 SDOH — SOCIAL STABILITY: SOCIAL INSECURITY: DOES ANYONE TRY TO KEEP YOU FROM HAVING/CONTACTING OTHER FRIENDS OR DOING THINGS OUTSIDE YOUR HOME?: NO

## 2024-05-13 SDOH — SOCIAL STABILITY: SOCIAL INSECURITY: ARE THERE ANY APPARENT SIGNS OF INJURIES/BEHAVIORS THAT COULD BE RELATED TO ABUSE/NEGLECT?: NO

## 2024-05-13 SDOH — HEALTH STABILITY: MENTAL HEALTH: ARE YOU HAVING THOUGHTS OF KILLING YOURSELF RIGHT NOW?: YES

## 2024-05-13 SDOH — HEALTH STABILITY: MENTAL HEALTH: IN THE PAST WEEK, HAVE YOU BEEN HAVING THOUGHTS ABOUT KILLING YOURSELF?: YES

## 2024-05-13 SDOH — SOCIAL STABILITY: SOCIAL NETWORK: IN A TYPICAL WEEK, HOW MANY TIMES DO YOU TALK ON THE PHONE WITH FAMILY, FRIENDS, OR NEIGHBORS?: TWICE A WEEK

## 2024-05-13 SDOH — ECONOMIC STABILITY: TRANSPORTATION INSECURITY
IN THE PAST 12 MONTHS, HAS LACK OF TRANSPORTATION KEPT YOU FROM MEETINGS, WORK, OR FROM GETTING THINGS NEEDED FOR DAILY LIVING?: NO

## 2024-05-13 SDOH — HEALTH STABILITY: MENTAL HEALTH: NON-SPECIFIC ACTIVE SUICIDAL THOUGHTS (PAST 1 MONTH): YES

## 2024-05-13 SDOH — SOCIAL STABILITY: SOCIAL INSECURITY
WITHIN THE LAST YEAR, HAVE YOU BEEN KICKED, HIT, SLAPPED, OR OTHERWISE PHYSICALLY HURT BY YOUR PARTNER OR EX-PARTNER?: NO

## 2024-05-13 SDOH — SOCIAL STABILITY: SOCIAL INSECURITY: HAS ANYONE EVER THREATENED TO HURT YOUR FAMILY OR YOUR PETS?: NO

## 2024-05-13 SDOH — SOCIAL STABILITY: SOCIAL INSECURITY: HAVE YOU HAD THOUGHTS OF HARMING ANYONE ELSE?: NO

## 2024-05-13 SDOH — HEALTH STABILITY: MENTAL HEALTH: IN THE PAST FEW WEEKS, HAVE YOU WISHED YOU WERE DEAD?: YES

## 2024-05-13 SDOH — HEALTH STABILITY: MENTAL HEALTH: ACTIVE SUICIDAL IDEATION WITH SOME INTENT TO ACT, WITHOUT SPECIFIC PLAN (PAST 1 MONTH): YES

## 2024-05-13 SDOH — HEALTH STABILITY: MENTAL HEALTH: ANXIETY SYMPTOMS: GENERALIZED

## 2024-05-13 SDOH — ECONOMIC STABILITY: HOUSING INSECURITY: FEELS SAFE LIVING IN HOME: YES

## 2024-05-13 ASSESSMENT — PAIN SCALES - GENERAL
PAINLEVEL_OUTOF10: 0 - NO PAIN

## 2024-05-13 ASSESSMENT — LIFESTYLE VARIABLES
HEADACHE, FULLNESS IN HEAD: NOT PRESENT
AUDITORY DISTURBANCES: NOT PRESENT
HOW OFTEN DO YOU HAVE 6 OR MORE DRINKS ON ONE OCCASION: NEVER
ORIENTATION AND CLOUDING OF SENSORIUM: ORIENTED AND CAN DO SERIAL ADDITIONS
VISUAL DISTURBANCES: NOT PRESENT
CIWA TOTAL SCORE: 1
BLOOD PRESSURE: 167/79
SUBSTANCE_ABUSE_PAST_12_MONTHS: NO
SKIP TO QUESTIONS 9-10: 1
AUDIT-C TOTAL SCORE: 2
ANXIETY: MILDLY ANXIOUS
AUDIT-C TOTAL SCORE: 2
AGITATION: NORMAL ACTIVITY
PRESCIPTION_ABUSE_PAST_12_MONTHS: NO
PAROXYSMAL SWEATS: NO SWEAT VISIBLE
NAUSEA AND VOMITING: NO NAUSEA AND NO VOMITING
TOTAL_SCORE: 1
TREMOR: NO TREMOR
PRESCIPTION_ABUSE_PAST_12_MONTHS: NO
SUBSTANCE_ABUSE_PAST_12_MONTHS: NO
HOW MANY STANDARD DRINKS CONTAINING ALCOHOL DO YOU HAVE ON A TYPICAL DAY: 1 OR 2
PULSE: 61
HOW OFTEN DO YOU HAVE A DRINK CONTAINING ALCOHOL: 2-4 TIMES A MONTH

## 2024-05-13 ASSESSMENT — COLUMBIA-SUICIDE SEVERITY RATING SCALE - C-SSRS
1. IN THE PAST MONTH, HAVE YOU WISHED YOU WERE DEAD OR WISHED YOU COULD GO TO SLEEP AND NOT WAKE UP?: YES
6. HAVE YOU EVER DONE ANYTHING, STARTED TO DO ANYTHING, OR PREPARED TO DO ANYTHING TO END YOUR LIFE?: NO
2. HAVE YOU ACTUALLY HAD ANY THOUGHTS OF KILLING YOURSELF?: YES
4. HAVE YOU HAD THESE THOUGHTS AND HAD SOME INTENTION OF ACTING ON THEM?: YES
5. HAVE YOU STARTED TO WORK OUT OR WORKED OUT THE DETAILS OF HOW TO KILL YOURSELF? DO YOU INTEND TO CARRY OUT THIS PLAN?: YES

## 2024-05-13 ASSESSMENT — PAIN - FUNCTIONAL ASSESSMENT
PAIN_FUNCTIONAL_ASSESSMENT: 0-10
PAIN_FUNCTIONAL_ASSESSMENT: 0-10

## 2024-05-13 ASSESSMENT — ACTIVITIES OF DAILY LIVING (ADL)
FEEDING YOURSELF: INDEPENDENT
ADEQUATE_TO_COMPLETE_ADL: YES
HEARING - RIGHT EAR: FUNCTIONAL
ASSISTIVE_DEVICE: EYEGLASSES
JUDGMENT_ADEQUATE_SAFELY_COMPLETE_DAILY_ACTIVITIES: YES
GROOMING: INDEPENDENT
TOILETING: INDEPENDENT
WALKS IN HOME: INDEPENDENT
PATIENT'S MEMORY ADEQUATE TO SAFELY COMPLETE DAILY ACTIVITIES?: YES
BATHING: INDEPENDENT
DRESSING YOURSELF: INDEPENDENT
HEARING - LEFT EAR: FUNCTIONAL

## 2024-05-13 ASSESSMENT — PATIENT HEALTH QUESTIONNAIRE - PHQ9
2. FEELING DOWN, DEPRESSED OR HOPELESS: NEARLY EVERY DAY
SUM OF ALL RESPONSES TO PHQ9 QUESTIONS 1 & 2: 6
1. LITTLE INTEREST OR PLEASURE IN DOING THINGS: NEARLY EVERY DAY

## 2024-05-13 ASSESSMENT — PAIN DESCRIPTION - PROGRESSION: CLINICAL_PROGRESSION: NOT CHANGED

## 2024-05-13 NOTE — PROGRESS NOTES
Patient was received in signout at 3:53 PM.     IN BRIEF   58-year-old male presents with suicidal ideations.    At the time of signout, patient was pending acceptance for psychiatric placement.       ED COURSE   Patient was accepted for transfer at an outside psychiatric hospital.  No acute events during my shift.  Patient was transferred in stable condition and EMTALA form was filled out.    FINAL IMPRESSION      1. Suicidal ideation    2. Auditory hallucinations          DISPOSITION    Transfer To Another Facility 05/13/2024 03:19:34 PM

## 2024-05-13 NOTE — ED PROVIDER NOTES
"HPI   Chief Complaint   Patient presents with    Suicidal     Patient called his work today and stated he would be \"ending it all with a knife\". His work contacted PD. Patient brought in by [facility], calm and cooperative. Reports no history of depression.        This is a 58-year-old male with a past medical history of diabetes, diabetic retinopathy with blindness in the right eye which is chronic, frequent retinal hemorrhages and vision loss in the left eye which is also chronic presenting to the ED for evaluation of depression, suicidal ideation, and auditory hallucinations.  He states that he had a rough week at work and since this week and has been hearing auditory hallucinations that he describes as voices in the room talking to him telling that \"it is time\", and \"this is it\".  He states that he does not feel the will to go on living, and has been taking about ending his life so that he does not have to deal with his chronic illnesses and other stressors anymore.  He denies any history of suicidal ideation like this, he has never had any hallucinations.  He has never been diagnosed with bipolar disorder or schizophrenia but does have a son who has been diagnosed with both.  He denies any pain other than intermittent headaches, any vision changes currently compared with his baseline.      History provided by:  Patient   used: No                        Gisell Coma Scale Score: 15                     Patient History   Past Medical History:   Diagnosis Date    Depressed 09/17/2023    Hypertension 09/17/2023    Personal history of other diseases of the circulatory system     History of arterial occlusion    Tubular adenoma of colon 11/28/2023    Type 2 diabetes mellitus without complication (Multi) 09/17/2023     Past Surgical History:   Procedure Laterality Date    OTHER SURGICAL HISTORY  11/11/2019    Wrist surgery    OTHER SURGICAL HISTORY  12/02/2019    Coronary artery bypass graft "     Family History   Problem Relation Name Age of Onset    No Known Problems Mother      No Known Problems Sister      No Known Problems Brother      No Known Problems Brother      No Known Problems Son      No Known Problems Son      No Known Problems Other wife      Social History     Tobacco Use    Smoking status: Never    Smokeless tobacco: Never   Substance Use Topics    Alcohol use: Yes    Drug use: Never       Physical Exam   ED Triage Vitals [05/13/24 1001]   Temperature Heart Rate Respirations BP   36.7 °C (98.1 °F) 63 16 (!) 190/81      Pulse Ox Temp Source Heart Rate Source Patient Position   98 % Oral Monitor Sitting      BP Location FiO2 (%)     Right arm --       Physical Exam  General: well developed, obese adult male who is awake and alert, oriented x 4.  Wife at bedside.  Eyes: sclera clear bilaterally, EOMI  HENT: normocephalic, atraumatic. Pharynx without erythema or exudates, uvula midline.  ROM of the neck is fully intact, there is no meningismus.  CV: regular rate and rhythm, no murmur, no gallops, or rubs. radial and dorsalis pedis pulses +2/4 bilaterally  Resp: clear to ascultation bilaterally, no wheezes, rales, or rhonchi  GI: abdomen soft, nontender without rigidity or guarding, no peritoneal signs, abdomen is nondistended, no masses palpated  MSK: strength +5/5 to upper and lower extremities bilaterally, no swelling of the extremities.  Neuro: no focal deficits, CN2-12 intact. Sensation fully intact.  Chronically blind in the right eye, unchanged from baseline.  Psych: appropriate mood and affect, cooperative with exam  Skin: warm, dry, without evidence of rash or abrasions    ED Course & MDM   Diagnoses as of 05/13/24 1229   Suicidal ideation   Auditory hallucinations       Medical Decision Making  PMH: Diabetes, diabetic retinopathy, depression  History obtained: directly from patient   Social factors affecting disposition: none    He is in no acute distress emergency room, he is awake  and alert, oriented x 4.  He has no focal neurologic deficits, NIHSS is 0.  Physical exam is otherwise unremarkable.  He has chronic blindness in the right eye and chronic vision loss in the left eye but he feels these are at baseline today.  Medical workup including head CT, urinalysis ordered to assess for evidence of acute intracranial pathology causing his acute onset of hallucinations and psychiatric symptoms, also will rule out infection or electrolyte abnormality.    Medical workup is unremarkable showing no evidence of acute infection, electrolyte abnormality, intoxication.  Head CT shows hyperdensity filling the right globe consistent with hemorrhage, patient has a history of hemorrhages in the right globe and has no vision in the right eye, this likely chronic.  He did recently see his ophthalmologist due to a hemorrhage that is already been evaluated and managed outpatient.  This should require no further management at the hospital.  There is no bleeding in the left eye which is his good eye that still has vision.    EKG my interpretation shows normal sinus rhythm, right bundle branch block.  Rate is 460 ms, LA interval 176.  No ST elevation or depression, no acute ischemic pattern.  No STEMI.    He is medically cleared for inpatient psychiatric assessment and management.    The patient was signed out to the oncoming physician pending final disposition.    Labs Reviewed   CBC WITH AUTO DIFFERENTIAL - Abnormal       Result Value    WBC 6.7      nRBC 0.0      RBC 4.37 (*)     Hemoglobin 13.0 (*)     Hematocrit 38.4 (*)     MCV 88      MCH 29.7      MCHC 33.9      RDW 12.4      Platelets 198      Neutrophils % 60.9      Immature Granulocytes %, Automated 0.3      Lymphocytes % 26.6      Monocytes % 8.5      Eosinophils % 3.1      Basophils % 0.6      Neutrophils Absolute 4.06      Immature Granulocytes Absolute, Automated 0.02      Lymphocytes Absolute 1.78      Monocytes Absolute 0.57      Eosinophils  Absolute 0.21      Basophils Absolute 0.04     COMPREHENSIVE METABOLIC PANEL - Abnormal    Glucose 161 (*)     Sodium 143      Potassium 5.0      Chloride 105      Bicarbonate 26      Urea Nitrogen 18      Creatinine 0.80      eGFR >90      Calcium 9.3      Albumin 4.3      Alkaline Phosphatase 94      Total Protein 7.0      AST 22      Bilirubin, Total 0.3      ALT 24      Anion Gap 12     DRUG SCREEN,URINE - Normal    Amphetamine Screen, Urine Negative      Barbiturate Screen, Urine Negative      Benzodiazepines Screen, Urine Negative      Cannabinoid Screen, Urine Negative      Cocaine Metabolite Screen, Urine Negative      Fentanyl Screen, Urine Negative      Methadone Screen, Urine Negative      Opiate Screen, Urine Negative      Oxycodone Screen, Urine Negative      PCP Screen, Urine Negative      Narrative:     These toxicological screening tests provide unconfirmed qualitative measurements to aid in treatment and diagnosis in cases of drug use or overdose. This test is used only for medical purposes. A positive result does not indicate or measure intoxication. For specific test performance or pathologist consultation, please contact the Laboratory.    The following threshold concentrations are used for these analyses.Values at or above the threshold concentration are reported as positive. Values below the threshold are reported as negative.    Drug /Screening Threshold                                                                                                 THC/CANNABINOIDS................50 ng/ml  METHADONE......................300 ng/ml  COCAINE METABOLITES............300 ng/ml  BENZODIAZEPINE.................300 ng/ml  PCP.............................25 ng/ml  OPIATE.........................300 ng/ml  AMPHETAMINE/ECSTASY...........1000 ng/ml  BARBITURATE....................200 ng/ml  OXYCODONE......................100 ng/ml  FENTANYL.........................5 ng/ml       ACUTE TOXICOLOGY PANEL,  BLOOD - Normal    Acetaminophen <5.0      Salicylate  <0      Alcohol <0.010     URINALYSIS WITH REFLEX CULTURE AND MICROSCOPIC - Normal    Color, Urine Light-Yellow      Appearance, Urine Clear      Specific Gravity, Urine 1.018      pH, Urine 6.0      Protein, Urine 10 (TRACE)      Glucose, Urine Normal      Blood, Urine NEGATIVE      Ketones, Urine NEGATIVE      Bilirubin, Urine NEGATIVE      Urobilinogen, Urine Normal      Nitrite, Urine NEGATIVE      Leukocyte Esterase, Urine NEGATIVE     URINALYSIS MICROSCOPIC WITH REFLEX CULTURE - Normal    WBC, Urine NONE      RBC, Urine NONE     SARS-COV-2 PCR   URINALYSIS WITH REFLEX CULTURE AND MICROSCOPIC    Narrative:     The following orders were created for panel order Urinalysis with Reflex Culture and Microscopic.  Procedure                               Abnormality         Status                     ---------                               -----------         ------                     Urinalysis with Reflex C...[[id number]]  Normal              Final result               Extra Urine Gray Tube[[id number]]                                                         Please view results for these tests on the individual orders.   EXTRA URINE GRAY TUBE     CT head wo IV contrast   Final Result   1. No evidence of acute cortical infarct or intracranial hemorrhage.   2. Interval development of hyperdensity filling the right globe, may   represent hemorrhage. Clinical correlation is recommended.             MACRO:   None             Signed by: Cesar Riggs 5/13/2024 12:14 PM   Dictation workstation:   [id number]            Procedure  Procedures     Fei Porter DO  05/13/24 3500

## 2024-05-13 NOTE — PROGRESS NOTES
EPAT - Social Work Psychiatric Assessment    Arrival Details  Mode of Arrival: Other (Comment) (Police)  Admission Source: Home  Admission Type: Involuntary  EPAT Assessment Start Date: 05/13/24  EPAT Assessment Start Time: 1130  Name of : SHAGUFTA Oconnor    History of Present Illness  Admission Reason: Psychiatric Evaluation  HPI: Pt is a 59 y/o  male presenting to [facility] ED via [facility] on a pink slip. Pt endorsing SI with a plan to cut himself with a sharp knife. Pt is not currently linked with a psychiatric provider.  reviewed patient’s chart and medical records, which indicate past medical hx and depression. The triage risk was reviewed and patient indicated to be a high risk during triage.  EPAT consulted for evaluation.    SW Readmission Information   Readmission within 30 Days: No    Psychiatric Symptoms  Anxiety Symptoms: Generalized  Depression Symptoms: Feelings of helplessness, Feelings of hopelessess, Feelings of worthlessness, Loss of interest, Change in energy level  [place] Symptoms: No problems reported or observed.    Psychosis Symptoms  Hallucination Type: Auditory, Command  Delusion Type: No problems reported or observed.    Additional Symptoms - Adult  Generalized Anxiety Disorder: Difficult to control worry, Excessive anxiety/worry  Obsessive Compulsive Disorder: No problems reported or observed.  Panic Attack: No problems reported or observed.  Post Traumatic Stress Disorder: No problems reported or observed.  Delirium: No problems reported or observed.    Past Psychiatric History/Meds/Treatments  Past Psychiatric History: Pt denies past psychiatric admissions  Past Psychiatric Meds/Treatments: Pt is medication adherent.  currently prescribed Zolft  Past Violence/Victimization History: None         Support System: Immediate family    Living Arrangement: House    Home Safety  Feels Safe Living in Home: Yes  Potentially Unsafe Housing Conditions: Unable  "to Assess    Income Information  Employment Status for: Patient  Employment Status: Employed  Income Source: Employed    [place] Service/Education History  Current or Previous  Service: None    Social/Cultural History  Social History: Pt is a 57 y/o Caucausin male that presents with large stature.  Cultural Requests During Hospitalization: None  Spiritual Requests During Hospitalization: None  Important Activities: Family    Legal  Legal Considerations: Patient/ Family Ability to Make Healthcare Decisions  Criminal Activity/ Legal Involvement Pertinent to Current Situation/ Hospitalization: N/A  Legal Concerns: N/A  Legal Comments: N/A    Drug Screening  Have you used any substances (canabis, cocaine, heroin, hallucinogens, inhalants, etc.) in the past 12 months?: No  Have you used any prescription drugs other than prescribed in the past 12 months?: No  Is a toxicology screen needed?: Yes         Psychosocial  Psychosocial (WDL): Within Defined Limits    Orientation  Orientation Level: Oriented X4    General Appearance  Motor Activity: Unremarkable  Speech Pattern: Excessively soft  General Attitude: Cooperative  Appearance/Hygiene: Unremarkable    Thought Process  [place]: [place] thinking  Content: Blaming self  Perception: Not altered  Hallucination: Auditory, Command  Judgment/Insight: Other (Comment) (Good)  Confusion: None    Sleep Pattern  Sleep Pattern: Disturbed/interrupted sleep    Risk Factors  Self Harm/Suicidal Ideation Plan: Pt endorse SI with plan to cut himself  Previous Self Harm/Suicidal Plans: No previous hx of self harm or suicide attempt  Risk Factors: Age < 19 years old, Male  Description of Thoughts/Ideas Leaving Unit Now: Command auditory hallucinations stating \"It's time\"    Violence Risk Assessment  Assessment of Violence: On admission  Thoughts of Harm to Others: No    Ability to Assess Risk Screen  Risk Screen - Ability to Assess: Unable to assess  Ask Suicide-Screening " "Questions  1. In the past few weeks, have you wished you were dead?: Yes  2. In the past few weeks, have you felt that you or your family would be better off if you were dead?: Yes  3. In the past week, have you been having thoughts about killing yourself?: Yes  4. Have you ever tried to kill yourself?: No  5. Are you having thoughts of killing yourself right now?: Yes  Describe your thoughts of killing yourself right now:: Pt endorsing command hallunications stating \"It's time\". Pt does not see a way out of this.  Calculated Risk Score: Imminent Risk  [place] Suicide Severity Rating Scale (Screener/Recent Self-Report)  1. Wish to be Dead (Past 1 Month): Yes  2. Non-Specific Active Suicidal Thoughts (Past 1 Month): Yes  3. Active Suicidal Ideation with any Methods (Not Plan) Without Intent to Act (Past 1 Month): Yes  4. Active Suicidal Ideation with Some Intent to Act, Without Specific Plan (Past 1 Month): Yes  5. Active Suicidal Ideation with Specific Plan and Intent (Past 1 Month): Yes  6. Suicidal Behavior (Lifetime): No  Calculated C-SSRS Risk Score (Lifetime/Recent): High Risk  Step 1: Risk Factors  Current & Past Psychiatric Dx: Mood disorder  Presenting Symptoms: Hopelessness or despair, Command hallucinations  Family History: Other (Comment) (Biological son has dx of Bipolar and Schizophrenia)  Precipitants/Stressors: Triggering events leading to humiliation, shame, and/or despair (e.g. loss of relationship, financial or health status) (real or anticipated), Chronic physical pain or other acute medical problem (e.g. CNS disorders)  Change in Treatment: Non-compliant or not receiving treatment  Access to Lethal Methods : No  Step 2: Protective Factors   Protective Factors Internal: Identifies reasons for living  Protective Factors External: Supportive social network or family or friends  Step 3: Suicidal Ideation Intensity  Most Severe Suicidal Ideation Identified: Cutting self with a sharp knife  How Many " Times Have You Had These Thoughts: Daily or almost daily  When You Have the Thoughts How Long do They Last : More than 8 hours/persistent or continuous  Could/Can You Stop Thinking About Killing Yourself or Wanting to Die if You Want to: Unable to control thoughts  Are There Things - Anyone or Anything - That Stopped You From Wanting to Die or Acting on: Uncertain that deterrents stopped you  What Sort of Reasons Did You Have For Thinking About Wanting to Die or Killing Yourself: Completely to end or stop the pain (you couldn't go on living with the pain or how you were feeling)  Total Score: 22  Step 5: Documentation  Risk Level: High suicide risk    Psychiatric Impression and Plan of Care  Assessment and Plan: Upon assessment, pt presents hopeless, helpless, flat affect and congruent mood and affect. Pt endorsing SI with plan. Pt was pink slipped by local PD after receiving a call from pt’s employer. Pt stated that he has had an increase is SI since Friday 5/10 after receiving distressing news from his employer. Pt struggled with an increase in SI over the weekend.  Today, pt went and bought and sharp knife to harm himself. Pt stated that he did not want to use a “dull” knife to end his life. Pt endorses hx of depression since open-heart surgery in 2019.  Pt is currently medication adherent.  Pt is taking Zoloft, prescribed by his PCP. Pt endorsing command AH that began on Friday.  Pt denies prior hx of AH/VH but endorse family hx of Bipolar and Schizophrenia.  Pt stated that the voices are telling him “It’s time.” Pt not able to safety plan and not able to keep himself safe in the community. Pt repeated, “I don’t see a way out of this.” Pt denies lethal firearms in the home. Pt is calm and cooperative in the ED and would benefit from inpatient admission for safety and stabilization.  Specific Resources Provided to Patient: Peer support no available at this location  CM Notified: N/A  PHP/IOP Recommended:  No  Specific Information Provided for PHP/IOP: Peer Support not avaialble at this time.  Plan Comments: Inpatient admission    Outcome/Disposition  Patient's Perception of Outcome Achieved: Pt is pink slipped  Assessment, Recommendations and Risk Level Reviewed with: Dr. Porter  Contact Name: Shiloh Pastor  Contact Number(s): [phone]  Contact Relationship: Spouse  EPAT Assessment Completed Date: 05/13/24  EPAT Assessment Completed Time: 1205  Patient Disposition:  Adult Inpatient Psych

## 2024-05-13 NOTE — CARE PLAN
"10:30 - SW met with pt and his wife at bedside for meet and greet. Pt is a 57 y/o male BIB by [facility] on a pink slip for SI with plan and intent. Pt stated that he has had an increase in SI since Friday 5/10 after he had a \"meltdown\" at work. Pt endorsing command AH stating \"It's time\" Pt went and bought a new knife because it was sharp and he did not want to use a dull knife. Pt endorsing hx of depression and currently taking Zoloft. No prior inpatient admissions.     13:45 - Sw asked MD to sign not to place referral.     14:15 - SW sent referral to [facility] via secure chat for review.     15:05 - SW notified that pt accepted to [facility] by Dr. Sherman. Nurse to Nurse report [phone].  [place] slip addressed and faxed.  signing off.       "

## 2024-05-14 LAB
25(OH)D3 SERPL-MCNC: 33 NG/ML (ref 30–100)
CHOLEST SERPL-MCNC: 134 MG/DL (ref 0–199)
CHOLESTEROL/HDL RATIO: 4.4
GLUCOSE BLD MANUAL STRIP-MCNC: 149 MG/DL (ref 74–99)
GLUCOSE BLD MANUAL STRIP-MCNC: 192 MG/DL (ref 74–99)
GLUCOSE P FAST SERPL-MCNC: 131 MG/DL (ref 74–99)
HDLC SERPL-MCNC: 30.4 MG/DL
LDLC SERPL CALC-MCNC: 68 MG/DL
NON HDL CHOLESTEROL: 104 MG/DL (ref 0–149)
TRIGL SERPL-MCNC: 177 MG/DL (ref 0–149)
VLDL: 35 MG/DL (ref 0–40)

## 2024-05-14 PROCEDURE — 97165 OT EVAL LOW COMPLEX 30 MIN: CPT | Mod: GO

## 2024-05-14 PROCEDURE — 2500000006 HC RX 250 W HCPCS SELF ADMINISTERED DRUGS (ALT 637 FOR ALL PAYERS): Performed by: PSYCHIATRY & NEUROLOGY

## 2024-05-14 PROCEDURE — 2500000001 HC RX 250 WO HCPCS SELF ADMINISTERED DRUGS (ALT 637 FOR MEDICARE OP): Performed by: PSYCHIATRY & NEUROLOGY

## 2024-05-14 PROCEDURE — 83718 ASSAY OF LIPOPROTEIN: CPT | Performed by: PSYCHIATRY & NEUROLOGY

## 2024-05-14 PROCEDURE — 82947 ASSAY GLUCOSE BLOOD QUANT: CPT | Performed by: PSYCHIATRY & NEUROLOGY

## 2024-05-14 PROCEDURE — 36415 COLL VENOUS BLD VENIPUNCTURE: CPT | Performed by: PSYCHIATRY & NEUROLOGY

## 2024-05-14 PROCEDURE — 99223 1ST HOSP IP/OBS HIGH 75: CPT

## 2024-05-14 PROCEDURE — 82306 VITAMIN D 25 HYDROXY: CPT | Mod: GEALAB | Performed by: PSYCHIATRY & NEUROLOGY

## 2024-05-14 PROCEDURE — 2500000002 HC RX 250 W HCPCS SELF ADMINISTERED DRUGS (ALT 637 FOR MEDICARE OP, ALT 636 FOR OP/ED): Performed by: NURSE PRACTITIONER

## 2024-05-14 PROCEDURE — 82947 ASSAY GLUCOSE BLOOD QUANT: CPT

## 2024-05-14 PROCEDURE — 1240000001 HC SEMI-PRIVATE BH ROOM DAILY

## 2024-05-14 RX ORDER — INSULIN GLARGINE 100 [IU]/ML
30 INJECTION, SOLUTION SUBCUTANEOUS NIGHTLY
Status: DISCONTINUED | OUTPATIENT
Start: 2024-05-14 | End: 2024-05-22 | Stop reason: HOSPADM

## 2024-05-14 RX ORDER — LOSARTAN POTASSIUM 25 MG/1
25 TABLET ORAL
Status: CANCELLED | OUTPATIENT
Start: 2024-05-15

## 2024-05-14 RX ORDER — LOSARTAN POTASSIUM 25 MG/1
25 TABLET ORAL 3 TIMES WEEKLY
Status: CANCELLED | OUTPATIENT
Start: 2024-05-14

## 2024-05-14 RX ADMIN — GABAPENTIN 300 MG: 300 CAPSULE ORAL at 08:53

## 2024-05-14 RX ADMIN — METFORMIN HYDROCHLORIDE 1000 MG: 500 TABLET, EXTENDED RELEASE ORAL at 20:43

## 2024-05-14 RX ADMIN — TRIAMCINOLONE ACETONIDE: 1 OINTMENT TOPICAL at 20:43

## 2024-05-14 RX ADMIN — METOPROLOL SUCCINATE 100 MG: 50 TABLET, EXTENDED RELEASE ORAL at 08:53

## 2024-05-14 RX ADMIN — ATORVASTATIN CALCIUM 80 MG: 80 TABLET, FILM COATED ORAL at 08:54

## 2024-05-14 RX ADMIN — TRIAMCINOLONE ACETONIDE: 1 OINTMENT TOPICAL at 08:54

## 2024-05-14 RX ADMIN — ASPIRIN 81 MG: 81 TABLET, COATED ORAL at 08:54

## 2024-05-14 RX ADMIN — SERTRALINE HYDROCHLORIDE 50 MG: 50 TABLET ORAL at 08:53

## 2024-05-14 RX ADMIN — METFORMIN HYDROCHLORIDE 1000 MG: 500 TABLET, EXTENDED RELEASE ORAL at 08:54

## 2024-05-14 RX ADMIN — INSULIN GLARGINE 30 UNITS: 100 INJECTION, SOLUTION SUBCUTANEOUS at 20:43

## 2024-05-14 SDOH — HEALTH STABILITY: MENTAL HEALTH
EXPERIENCED ANY OF THE FOLLOWING LIFE EVENTS: PHYSICAL ASSAULT;SEXUAL ASSAULT;CHILDHOOD NEGLECT;SOCIAL LOSS (BANKRUPTCY, DIVORCE, WORK-RELATED STRESS)

## 2024-05-14 SDOH — SOCIAL STABILITY: SOCIAL INSECURITY: POSSIBLE ABUSE REPORTED TO:: COUNTY SOCIAL SERVICES

## 2024-05-14 SDOH — SOCIAL STABILITY: SOCIAL INSECURITY: ARE YOU OR HAVE YOU BEEN THREATENED OR ABUSED PHYSICALLY, EMOTIONALLY, OR SEXUALLY BY ANYONE?: YES

## 2024-05-14 SDOH — SOCIAL STABILITY: SOCIAL INSECURITY: HAVE YOU HAD ANY THOUGHTS OF HARMING ANYONE ELSE?: NO

## 2024-05-14 SDOH — SOCIAL STABILITY: SOCIAL INSECURITY

## 2024-05-14 SDOH — SOCIAL STABILITY: SOCIAL INSECURITY: ARE THERE ANY APPARENT SIGNS OF INJURIES/BEHAVIORS THAT COULD BE RELATED TO ABUSE/NEGLECT?: NO

## 2024-05-14 SDOH — SOCIAL STABILITY: SOCIAL INSECURITY: DO YOU FEEL UNSAFE GOING BACK TO THE PLACE WHERE YOU ARE LIVING?: NO

## 2024-05-14 SDOH — SOCIAL STABILITY: SOCIAL INSECURITY: ABUSE: ADULT

## 2024-05-14 ASSESSMENT — ENCOUNTER SYMPTOMS
CHILLS: 0
DIARRHEA: 0
LIGHT-HEADEDNESS: 0
DYSPHORIC MOOD: 1
NERVOUS/ANXIOUS: 0
CONSTIPATION: 0
SLEEP DISTURBANCE: 0
DIZZINESS: 0
NAUSEA: 0
SHORTNESS OF BREATH: 0
HALLUCINATIONS: 1
VOMITING: 0
FEVER: 0
PALPITATIONS: 0
FATIGUE: 1

## 2024-05-14 ASSESSMENT — LIFESTYLE VARIABLES
AUDIT-C TOTAL SCORE: 4
HOW MANY STANDARD DRINKS CONTAINING ALCOHOL DO YOU HAVE ON A TYPICAL DAY: 3 OR 4
PRESCIPTION_ABUSE_PAST_12_MONTHS: NO
SKIP TO QUESTIONS 9-10: 0
HOW OFTEN DO YOU HAVE 6 OR MORE DRINKS ON ONE OCCASION: MONTHLY
HOW OFTEN DO YOU HAVE A DRINK CONTAINING ALCOHOL: MONTHLY OR LESS
AUDIT-C TOTAL SCORE: 4
SUBSTANCE_ABUSE_PAST_12_MONTHS: NO

## 2024-05-14 ASSESSMENT — PAIN - FUNCTIONAL ASSESSMENT
PAIN_FUNCTIONAL_ASSESSMENT: 0-10
PAIN_FUNCTIONAL_ASSESSMENT: 0-10

## 2024-05-14 ASSESSMENT — PAIN SCALES - GENERAL
PAINLEVEL_OUTOF10: 0 - NO PAIN
PAINLEVEL_OUTOF10: 0 - NO PAIN

## 2024-05-14 NOTE — GROUP NOTE
Group Topic: Goals   Group Date: 5/14/2024  Start Time: 0730  End Time: 0800  Facilitators: JEN Das   Department: [facility] REHAB THERAPY VIRTUAL    Number of Participants: 8   Group Focus: check in, goals, and personal responsibility  Treatment Modality: Other: Recreational Therapy   Interventions utilized were exploration, patient education, and support  Purpose: self-care and other: smart goals, healthy habits    Name: Josué Pastor YOB: 1965   MR: [id number]      Facilitator: Recreational Therapist  Level of Participation: did not attend  Progress: None  Comments: Patients were provided with a SMART Goals worksheet for morning goal session (specific, measurable, attainable, relevant, and timely). We discussed setting goals and challenging ourselves to make healthy lifestyle changes.    Patient declined invitation to group activity at this time. Patient will continue to be provided with opportunities to enhance leisure skills and/or coping mechanisms.    Plan: continue with services
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
## 2024-05-14 NOTE — CARE PLAN
"Patient in room most of shift, withdrawn to self. Rated anxiety 5/10 and depression 10/10. Denied SI/HI. Denied auditory/visual/other hallucinations. No complaints of pain or discomfort. Medication compliant. New order of Zoloft 150mg PO. Able to state positive coping skills such as \"playing records, listening to music\". When asked for a goal, pt stated \"I don't have goals, those  with the open heart surgery\". Q15 minute checks to be maintained throughout shift for safety.         " Call returned to patient and advised to come into clinic to give a urine sample, will call patient when results are available

## 2024-05-14 NOTE — GROUP NOTE
"Group Topic: Gross Motor/Balance Skills   Group Date: 5/14/2024  Start Time: 1400  End Time: 1450  Facilitators: JEN Das   Department: [facility] REHAB THERAPY VIRTUAL    Number of Participants: 6   Group Focus: leisure skills and social skills  Treatment Modality: Other: Recreational Therapy   Interventions utilized were exploration and leisure development  Purpose: other: motor skills, leisure awareness, social engagement, healthy competition     Name: Josué Pastor YOB: 1965   MR: [id number]      Facilitator: Recreational Therapist  Level of Participation: did not attend  Progress: None  Comments: Patients were gathered and encouraged to actively participate in the game \"[employer] [place]\". This activity works on motor skills/coordinating movements, healthy competition, social interaction, and provides an overall pleasurable experience.    Pt meeting with staff during session, unable to attend.    Plan: continue with services      "

## 2024-05-14 NOTE — PROGRESS NOTES
"Occupational Therapy     REHAB Therapy Assessment & Treatment    Patient Name: Josué Pastor  MRN: [id number]  Today's Date: 5/14/2024      Time In: 859 Time Out: 914  Date of OT Referral: 5/14   Admission Diagnosis: depression with SI   Reason for Referral: psyche  General Information   Past Medical History/History of Present Illness: depression, hypertension, hyperlipidemia, tubular adenoma of the colon, insulin-dependent diabetes mellitus type 2, diabetic retinopathy, right eye blindness, decreased vision in the left eye    Pain: 0/10    Precautions: none   Appearance: Pt isolating in bed with eyes closed upon arrival   Initial Response to Eval: Pt eager for group participation    Current Stressors: Vision loss, loss of job role  Personal History   Marital Status:    Support System: has supportive wife, two adult sons   Living Situation: home with family   Home Set-Up: no concerns   DME Used and/or Owned: none   Prior Level of Function: independent   Employment Status: Works at an [employer] , 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 States his work is keeping him there trying to find work for him but \"I feel like they are giving me handouts\"   Leisure Interests: pt experiencing anhedonia, states he has not been able to enjoy any leisure activity since \"prime Covid times)  Psychosocial/Cognition Assessment   Orientation: AOx4   Attention: WFL   Follows Commands: WFL   Mood: Pt depressed, but overall friendly and talkative.   Safety Awareness: WFL   Patient Identified Life Roles: worker,    Patient Identified Goals-Short Term: Attend OT treatments and group sessions Long Term: enjoy leisure activities, cope with loss of vision  Perceptual/Communication Skills   Speech (dysarthric, exp/rec aphasia, pressured, etc.): WFL   Vision: Pt blind in R eye; gets injections every 3 months in L eye and states he cannot see at all when it bleeds, impacting work and " "general life   Perception (inattention, delusions, hallucinations, suicidal, etc):    Reality Based Behavior: WFL   Perseverations: WFL   Social Skills/Behavior: Pt pleasant, friendly cooperative, asked/answered questions appropriately  Basic Functional Mobility   Device Used: none   Bed Mobility: independent   Transfer Status: independent   Ambulatory Status:  independent   Balance: independent   Endurance: independent  Activities of Daily Living   Grooming/Hygiene: independent   Dressing: independent   Bathing: independent   Toileting: independent   Sleep: pt states he sleeps too much \"that's all I can do\"  Instrumental Activities of Daily Living   Medication Management/Compliance: pt states he is taking zoloft regularly, unsure of benefits.No issues with compliance.   Managing Finances: No issues   Patient Reported Daily Routine/Responsibility: \"go to work and come home to sleep\". Would benefit from exploration of routine management  Emotional/Mental Health Management   Patient Identified Coping Skills- Positive: none  Negative: none   Knowledge of Illness/Emotions: Pt aware of increased depressive symptoms, would benefit from exploration and education of illness. Pt states he has felt this way since open heart surgery during Covid   Stress Management: No coping strategies identified, would benefit from exploration of topic   Anger/Frustration Management: WFL   Depression/Anxiety Management:No coping strategies identified, would benefit from exploration of topic   Patient Identified Strengths: none   Patient Identified Weaknesses: poor vision   Relapse Prevention Skills/Supports: has good family supports, would benefit from exploration of community resources/vision loss groups      Encounter Problems       Encounter Problems (Active)       OT Goals       Pt will explore and ID 1-2 strategies to manage stressors/symptoms of illness/ grief more effectively prior to discharge.  (Progressing)       Start:  05/14/24   "  Expected End:  06/04/24            Pt will ID/ utilize 1-2 ways to increase balance of activity/ re-involve self in functional daily routines/roles prior to discharge.  (Progressing)       Start:  05/14/24    Expected End:  06/04/24            Pt will explore/ ID 1-2 meaningful leisure activities to improve QOL for post discharge use.  (Progressing)       Start:  05/14/24    Expected End:  06/04/24            Pt will ID 2-3 effective ways to distract from crisis and ID stressors/ personal symptoms of stress in order to improve management of stress during daily activities.  (Progressing)       Start:  05/14/24    Expected End:  06/04/24            Pt will ID 2 community resources/programs to join/attend after D/C to improve their support system (Progressing)       Start:  05/14/24    Expected End:  06/04/24                     Education Documentation  No documentation found.  Education Comments  No comments found.          Additional Comments:

## 2024-05-14 NOTE — NURSING NOTE
"Patient arrived on unit via stretcher  from [facility] @ 1900. A&Ox3, VVS with no complaints of pain at his time. Reports anxiety and depression of 10+. Denies SI/HI at this time, but contracted for safety with nurse. Stated he had been experiencing Aud. Hallucinations in the form of a voice telling him \" Now is the time\".  Patient strengths are \"not a quitter and loves people\". Patient stated he was \"tired of living, don't see any point\".    "

## 2024-05-14 NOTE — GROUP NOTE
"Group Topic: Chemical Dependency - Dual Diagnosis   Group Date: 5/14/2024  Start Time: 1600  End Time: 1700  Facilitators: JEN Das   Department: [facility] REHAB THERAPY VIRTUAL    Number of Participants: 7   Group Focus: coping skills, dual diagnosis, and relapse prevention  Treatment Modality: Other: Recreational Therapy   Interventions utilized were patient education and support  Purpose: coping skills, insight or knowledge, relapse prevention strategies, and trigger / craving management    Name: Josué Pastor YOB: 1965   MR: [id number]      Facilitator: Recreational Therapist  Level of Participation: did not attend  Progress: None  Comments: Patients were provided with the \"Coping Skills Addictions\" handout which includes four main areas (social support, diversions, building new habits, and prevention). We had discussion and patients were given an opportunity to share their thoughts. Patients were also provided with the \"Relapse Prevention Plan\" worksheet and encouraged to complete independently.    Patient declined invitation to group activity at this time. Patient will continue to be provided with opportunities to enhance leisure skills and/or coping mechanisms.    Plan: continue with services      "
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
## 2024-05-14 NOTE — PROGRESS NOTES
Occupational Therapy     REHAB Therapy Assessment & Treatment    Patient Name: Josué Pastor  MRN: [id number]  Today's Date: 5/14/2024      Activity Assessment:     Acceptance Art and Creativity Group: [id number]  Future Self Letter Group: [id number]  Cognitive Task/ Team Collaboration Group: [id number]      0/3 Groups attended     Following OT eval and in collaboration with the OTR/L, pt declines to attend any above HARRELL group despite encouragement. Pt remains in his room throhgout and is not observed on the unit until later this PM. No HARRELL groups this date. Pt would benefit from continued OT services in order to improve overall self-esteem, personal confidence and positive supports for safe transition at discharge.      Encounter Problems       Encounter Problems (Active)       OT Goals       Pt will explore and ID 1-2 strategies to manage stressors/symptoms of illness/ grief more effectively prior to discharge.  (Progressing)       Start:  05/14/24    Expected End:  06/04/24            Pt will ID/ utilize 1-2 ways to increase balance of activity/ re-involve self in functional daily routines/roles prior to discharge.  (Progressing)       Start:  05/14/24    Expected End:  06/04/24            Pt will explore/ ID 1-2 meaningful leisure activities to improve QOL for post discharge use.  (Progressing)       Start:  05/14/24    Expected End:  06/04/24            Pt will ID 2-3 effective ways to distract from crisis and ID stressors/ personal symptoms of stress in order to improve management of stress during daily activities.  (Progressing)       Start:  05/14/24    Expected End:  06/04/24            Pt will ID 2 community resources/programs to join/attend after D/C to improve their support system (Progressing)       Start:  05/14/24    Expected End:  06/04/24                     Additional Comments:  HARRELL collaborated with patients nurse and charge nurse throughout the day to provide appropriate support and  encouragement to attend groups. Pt up on unit when HARRELL left last group of the day. All needs met.
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
## 2024-05-14 NOTE — CONSULTS
History Of Present Illness  Josué Pastor is a 58 y.o. male with a past medical history of depression, hypertension, hyperlipidemia, tubular adenoma of the colon, insulin-dependent diabetes mellitus type 2, diabetic retinopathy, right eye blindness, decreased vision in the left eye (he states that he was blind also in the left eye about 2 weeks ago due to recurrent hemorrhage bilaterally: He requires recurrent injection in both eyes every 3 months), glaucoma, coronary artery disease status post quadruple bypass in 2019, and asthma who was admitted to the behavioral health unit for depression with suicidal ideation.  Medicine was consulted for medical management of the comorbidities mentioned above.  Patient was seen and examined.  He currently denies any pain but he was stating that he has a lot of medical comorbidities that contribute to his mood problems.  He currently denies headache, dizziness, chest pain, palpitation, difficulty breathing, nausea, vomiting, abdominal pain, urinary symptoms, leg pain or leg swelling.     Past Medical History  He has a past medical history of Depressed (09/17/2023), Hypertension (09/17/2023), Personal history of other diseases of the circulatory system, Tubular adenoma of colon (11/28/2023), and Type 2 diabetes mellitus without complication (Multi) (09/17/2023).    Surgical History  He has a past surgical history that includes Other surgical history (11/11/2019) and Other surgical history (12/02/2019).     Social History  He reports that he has never smoked. He has never used smokeless tobacco. He reports current alcohol use. He reports that he does not use drugs.    Family History  Family History   Problem Relation Name Age of Onset    No Known Problems Mother      No Known Problems Sister      No Known Problems Brother      No Known Problems Brother      No Known Problems Son      No Known Problems Son      No Known Problems Other wife         Allergies  Patient has no  known allergies.    Medications  Scheduled medications  aspirin, 81 mg, oral, Daily  atorvastatin, 80 mg, oral, Daily  gabapentin, 300 mg, oral, Daily  metFORMIN XR, 1,000 mg, oral, BID  metoprolol succinate XL, 100 mg, oral, Daily  sertraline, 50 mg, oral, Daily  triamcinolone, , Topical, BID      Continuous medications     PRN medications  PRN medications: acetaminophen, alum-mag hydroxide-simeth, diphenhydrAMINE **OR** diphenhydrAMINE, haloperidol **OR** haloperidol lactate, hydrOXYzine pamoate, LORazepam **OR** LORazepam, melatonin, psyllium    Review of systems: 10-point review of systems is negative.     Physical Exam  Constitutional: alert and oriented x 3, awake, cooperative, no acute distress  Skin: warm and dry  Head/Neck: Normocephalic, atraumatic  Eyes: clear sclera, right eye blindness  ENMT: mucous membranes moist  Cardio: Regular rate and rhythm  Resp: CTA bilaterally, good respiratory effort  Gastrointestinal: Soft, nontender, nondistended  Musculoskeletal: ROM intact, no joint swelling  Extremities: No edema, cyanosis, or clubbing  Neuro: lert and oriented x 3, sensation is intact.  Patient moves all limbs against resistance.  Psychological: Depressed     Last Recorded Vitals  /79   Pulse 61   Temp 36.1 °C (97 °F) (Temporal)   Resp 18   Wt 97.5 kg (214 lb 15.2 oz)   SpO2 96%     Relevant Results  Admission on 05/13/2024, Discharged on 05/13/2024   Component Date Value Ref Range Status    WBC 05/13/2024 6.7  4.4 - 11.3 x10*3/uL Final    nRBC 05/13/2024 0.0  0.0 - 0.0 /100 WBCs Final    RBC 05/13/2024 4.37 (L)  4.50 - 5.90 x10*6/uL Final    Hemoglobin 05/13/2024 13.0 (L)  13.5 - 17.5 g/dL Final    Hematocrit 05/13/2024 38.4 (L)  41.0 - 52.0 % Final    MCV 05/13/2024 88  80 - 100 fL Final    MCH 05/13/2024 29.7  26.0 - 34.0 pg Final    MCHC 05/13/2024 33.9  32.0 - 36.0 g/dL Final    RDW 05/13/2024 12.4  11.5 - 14.5 % Final    Platelets 05/13/2024 198  150 - 450 x10*3/uL Final    Neutrophils  % 05/13/2024 60.9  40.0 - 80.0 % Final    Immature Granulocytes %, Automated 05/13/2024 0.3  0.0 - 0.9 % Final    Immature Granulocyte Count (IG) includes promyelocytes, myelocytes and metamyelocytes but does not include bands. Percent differential counts (%) should be interpreted in the context of the absolute cell counts (cells/UL).    Lymphocytes % 05/13/2024 26.6  13.0 - 44.0 % Final    Monocytes % 05/13/2024 8.5  2.0 - 10.0 % Final    Eosinophils % 05/13/2024 3.1  0.0 - 6.0 % Final    Basophils % 05/13/2024 0.6  0.0 - 2.0 % Final    Neutrophils Absolute 05/13/2024 4.06  1.20 - 7.70 x10*3/uL Final    Percent differential counts (%) should be interpreted in the context of the absolute cell counts (cells/uL).    Immature Granulocytes Absolute, Au* 05/13/2024 0.02  0.00 - 0.70 x10*3/uL Final    Lymphocytes Absolute 05/13/2024 1.78  1.20 - 4.80 x10*3/uL Final    Monocytes Absolute 05/13/2024 0.57  0.10 - 1.00 x10*3/uL Final    Eosinophils Absolute 05/13/2024 0.21  0.00 - 0.70 x10*3/uL Final    Basophils Absolute 05/13/2024 0.04  0.00 - 0.10 x10*3/uL Final    Glucose 05/13/2024 161 (H)  65 - 99 mg/dL Final    Sodium 05/13/2024 143  133 - 145 mmol/L Final    Potassium 05/13/2024 5.0  3.4 - 5.1 mmol/L Final    Chloride 05/13/2024 105  97 - 107 mmol/L Final    Bicarbonate 05/13/2024 26  24 - 31 mmol/L Final    Urea Nitrogen 05/13/2024 18  8 - 25 mg/dL Final    Creatinine 05/13/2024 0.80  0.40 - 1.60 mg/dL Final    eGFR 05/13/2024 >90  >60 mL/min/1.73m*2 Final    Calculations of estimated GFR are performed using the 2021 CKD-EPI Study Refit equation without the race variable for the IDMS-Traceable creatinine methods.  https://jasn.asnjournals.org/content/early/2021/09/22/ASN.8479939941    Calcium 05/13/2024 9.3  8.5 - 10.4 mg/dL Final    Albumin 05/13/2024 4.3  3.5 - 5.0 g/dL Final    Alkaline Phosphatase 05/13/2024 94  35 - 125 U/L Final    Total Protein 05/13/2024 7.0  5.9 - 7.9 g/dL Final    AST 05/13/2024 22  5 - 40  U/L Final    Sample slightly hemolyzed results may be affected    Bilirubin, Total 05/13/2024 0.3  0.1 - 1.2 mg/dL Final    ALT 05/13/2024 24  5 - 40 U/L Final    Anion Gap 05/13/2024 12  <=19 mmol/L Final    Amphetamine Screen, Urine 05/13/2024 Negative    Final    Barbiturate Screen, Urine 05/13/2024 Negative    Final    Benzodiazepines Screen, Urine 05/13/2024 Negative    Final    Cannabinoid Screen, Urine 05/13/2024 Negative    Final    Cocaine Metabolite Screen, Urine 05/13/2024 Negative    Final    Fentanyl Screen, Urine 05/13/2024 Negative     Final    Methadone Screen, Urine 05/13/2024 Negative    Final    Opiate Screen, Urine 05/13/2024 Negative    Final    Oxycodone Screen, Urine 05/13/2024 Negative    Final    PCP Screen, Urine 05/13/2024 Negative    Final    Acetaminophen 05/13/2024 <5.0  15.0 - 30.0 ug/mL Final    Salicylate  05/13/2024 <0  3 - 25 mg/dL Final    Alcohol 05/13/2024 <0.010  0.000 - 0.010 g/dL Final    Coronavirus 2019, PCR 05/13/2024 Not Detected  Not Detected Final    Color, Urine 05/13/2024 Light-Yellow  Light-Yellow, Yellow, Dark-Yellow Final    Appearance, Urine 05/13/2024 Clear  Clear Final    Specific Gravity, Urine 05/13/2024 1.018  1.005 - 1.035 Final    pH, Urine 05/13/2024 6.0  5.0, 5.5, 6.0, 6.5, 7.0, 7.5, 8.0 Final    Protein, Urine 05/13/2024 10 (TRACE)  NEGATIVE, 10 (TRACE), 20 (TRACE) mg/dL Final    Glucose, Urine 05/13/2024 Normal  Normal mg/dL Final    Blood, Urine 05/13/2024 NEGATIVE  NEGATIVE Final    Ketones, Urine 05/13/2024 NEGATIVE  NEGATIVE mg/dL Final    Bilirubin, Urine 05/13/2024 NEGATIVE  NEGATIVE Final    Urobilinogen, Urine 05/13/2024 Normal  Normal mg/dL Final    Nitrite, Urine 05/13/2024 NEGATIVE  NEGATIVE Final    Leukocyte Esterase, Urine 05/13/2024 NEGATIVE  NEGATIVE Final    WBC, Urine 05/13/2024 NONE  1-5, NONE /HPF Final    RBC, Urine 05/13/2024 NONE  NONE, 1-2, 3-5 /HPF Final    POCT Glucose 05/13/2024 153 (H)  74 - 99 mg/dL Final    POCT Glucose  05/13/2024 127 (H)  74 - 99 mg/dL Final      CT head wo IV contrast    Result Date: 5/13/2024  Interpreted By:  Cesar Riggs, STUDY: CT HEAD WO IV CONTRAST; 5/13/2024 12:04 pm   INDICATION: Signs/Symptoms:new onset auditory hallucinations, headaches since this weekend.   COMPARISON: CT head dated 05/16/2022   ACCESSION NUMBER(S): [id number]   ORDERING CLINICIAN: JENNA LLOYD   TECHNIQUE: Contiguous axial CT images were obtained through the head at 2 mm slice thickness without contrast administration.   FINDINGS: INTRACRANIAL: The ventricles, sulci and basal cisterns are within normal limits for size and configuration. The grey-white differentiation is intact. There is no mass effect or midline shift. There is no extraaxial fluid collection. There is no intracranial hemorrhage.  The calvarium is unremarkable.   EXTRACRANIAL: Visualized paranasal sinuses and mastoids are clear.   There has been interval development of hyperdensity filling the right globe.       1. No evidence of acute cortical infarct or intracranial hemorrhage. 2. Interval development of hyperdensity filling the right globe, may represent hemorrhage. Clinical correlation is recommended.     MACRO: None     Signed by: Cesar Riggs 5/13/2024 12:14 PM Dictation workstation:   [id number]       Assessment/Plan   Principal Problem:    Depression with suicidal ideation    Josué Pastor is a 58 y.o. male with a past medical history of depression, hypertension, hyperlipidemia, tubular adenoma of the colon, insulin-dependent diabetes mellitus type 2, diabetic retinopathy, right eye blindness, decreased vision in the left eye (he states that he was blind also in the left eye about 2 weeks ago due to recurrent hemorrhage bilaterally: He requires recurrent injection in both eyes every 3 months), glaucoma, coronary artery disease status post quadruple bypass in 2019, and asthma who was admitted to the behavioral health unit for depression with  suicidal ideation.      Diabetes mellitus type 2  -Last HbA1c was 8 on 5/16/2022.  No recent HbA1c in our system.  -Patient takes Lantus 50 units subcu at bedtime  -He is also on dulaglutide  -Dulaglutide is not in our formulary.  Will continue Lantus and sliding scale insulin  -I will defer further management to the patient primary care physician as outpatient.  Hypertension  -We will resume Cozaar    Medication reconciliation is not available yet.  Therefore I am not able to restart all of his medications yet.    Will resume home meds for the other comorbidities when med rec is available.    Depression with suicidal ideation  -Follow-up with psychiatry  -Continue current management    Lab review did not show any significant electrolyte imbalance    DVT prophylaxis  -Ambulation         Yenny Corral MD
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
## 2024-05-14 NOTE — CARE PLAN
"The patient's goals for the shift include Sleep    The clinical goals for the shift include \" I guess sleep\"    Over the shift, the patient did make progress toward the following goals.   Problem: Sensory Perceptual Alteration as Evidenced by  Goal: Cooperates with admission process  Outcome: Progressing  Goal: Patient/Family participate in treatment and discharge plans  Outcome: Progressing  Goal: Patient/Family verbalizes awareness of resources  Outcome: Progressing  Goal: Participates in unit activities  Outcome: Progressing  Goal: Discusses signs/symptoms of illness/treatment options  Outcome: Progressing  Goal: Initiates reality-based interactions  Outcome: Progressing  Goal: Able to discuss content of hallucinations/delusions  Outcome: Progressing  Goal: Notifies staff when experiencing hallucinations/delusions  Outcome: Progressing  Goal: Verbalizes reduction in hallucinations/delusions  Outcome: Progressing  Goal: Will not act on psychotic perception  Outcome: Progressing  Goal: Understands least restrictive measures  Outcome: Progressing  Goal: Free from restraint events  Outcome: Progressing     Problem: Altered Thought Processes as Evidenced by  Goal: STG - Desires improvement in ability to think and concentrate  Outcome: Progressing  Goal: STG - Participates in Occupational Therapy and other cognitive assessments  Outcome: Progressing     Problem: Potential for Harm to Self or Others  Goal: Cooperates with admission process  Outcome: Progressing  Goal: Participates in unit activities  Outcome: Progressing  Goal: Patient/Family participate in treatment and discharge plans  Outcome: Progressing  Goal: Identifies deescalation techniques  Outcome: Progressing  Goal: Understands least restrictive measures  Outcome: Progressing  Goal: Identifies stressors that lead to harmful behaviors  Outcome: Progressing  Goal: Notifies staff when experiencing harmful thoughts toward self/others  Outcome: Progressing  Goal: " Denies harm toward self or others  Outcome: Progressing  Goal: Free from restraint events  Outcome: Progressing     Problem: Educational/Scholastic Disruption  Goal: Meets educational requirements during hospitalization  Outcome: Progressing  Goal: Attends class without disruptive behavior  Outcome: Progressing  Goal: Completes daily assignments  Outcome: Progressing     Problem: Ineffective Coping  Goal: Cooperates with admission process  Outcome: Progressing  Goal: Identifies ineffective coping skills  Outcome: Progressing  Goal: Identifies healthy coping skills  Outcome: Progressing  Goal: Demonstrates healthy coping skills  Outcome: Progressing  Goal: Participates in unit activities  Outcome: Progressing  Goal: Patient/Family participate in treatment and discharge plans  Outcome: Progressing  Goal: Patient/Family verbalizes awareness of resources  Outcome: Progressing  Goal: Understands least restrictive measures  Outcome: Progressing  Goal: Free from restraint events  Outcome: Progressing     Problem: Alteration in Sleep  Goal: STG - Reports nightly sleep, duration, and quality  Outcome: Progressing  Goal: STG - Identifies sleep hygiene aids  Outcome: Progressing  Goal: STG - Informs staff if unable to sleep  Outcome: Progressing  Goal: STG - Attends breathing and relaxation group  Outcome: Progressing     Problem: Potential for Substance Withdrawal  Goal: Verbalizes signs/symptoms of withdrawal  Outcome: Progressing  Goal: Reports signs/symptoms of withdrawal  Outcome: Progressing  Goal: Free of withdrawal symptoms  Outcome: Progressing     Problem: Anxiety  Goal: Patient/family understands admission protocols  Outcome: Progressing  Goal: Attempts to manage anxiety with help  Outcome: Progressing  Goal: Verbalizes ways to manage anxiety  Outcome: Progressing  Goal: Implements measures to reduce anxiety  Outcome: Progressing  Goal: Free from restraint events  Outcome: Progressing     Problem: Self Care  Deficit  Goal: STG - Patient completes hygiene  Outcome: Progressing  Goal: Increase group attendance  Outcome: Progressing  Goal: Accepts need for medications  Outcome: Progressing  Goal: STG - Goes to and eats meals independently in dining room 100% of time  Outcome: Progressing     Problem: Defensive Coping  Goal: Cooperates with admission process  Outcome: Progressing  Goal: Identifies reckless/dangerous behavior  Outcome: Progressing  Goal: Identifies stressors that lead to reckless/dangerous behavior  Outcome: Progressing  Goal: Discusses and identifies healthy coping skills  Outcome: Progressing  Goal: Demonstrates healthy coping skills  Outcome: Progressing  Goal: Identifies appropriate social interaction  Outcome: Progressing  Goal: Demonstrates appropriate social interactions  Outcome: Progressing  Goal: Patient/Family verbalizes awareness of resources  Outcome: Progressing  Goal: Discusses signs/symptoms of illness/treatment options  Outcome: Progressing  Goal: Patient/Family participate in treatment and discharge plans  Outcome: Progressing  Goal: Understands least restrictive measures  Outcome: Progressing  Goal: Free from restraint events  Outcome: Progressing     Problem: Pain  Goal: Takes deep breaths with improved pain control throughout the shift  Outcome: Progressing  Goal: Turns in bed with improved pain control throughout the shift  Outcome: Progressing  Goal: Walks with improved pain control throughout the shift  Outcome: Progressing  Goal: Performs ADL's with improved pain control throughout shift  Outcome: Progressing  Goal: Participates in PT with improved pain control throughout the shift  Outcome: Progressing  Goal: Free from opioid side effects throughout the shift  Outcome: Progressing  Goal: Free from acute confusion related to pain meds throughout the shift  Outcome: Progressing

## 2024-05-15 LAB
GLUCOSE BLD MANUAL STRIP-MCNC: 147 MG/DL (ref 74–99)
GLUCOSE BLD MANUAL STRIP-MCNC: 173 MG/DL (ref 74–99)
GLUCOSE BLD MANUAL STRIP-MCNC: 207 MG/DL (ref 74–99)
GLUCOSE BLD MANUAL STRIP-MCNC: 208 MG/DL (ref 74–99)

## 2024-05-15 PROCEDURE — 2500000006 HC RX 250 W HCPCS SELF ADMINISTERED DRUGS (ALT 637 FOR ALL PAYERS): Performed by: PSYCHIATRY & NEUROLOGY

## 2024-05-15 PROCEDURE — 2500000002 HC RX 250 W HCPCS SELF ADMINISTERED DRUGS (ALT 637 FOR MEDICARE OP, ALT 636 FOR OP/ED): Performed by: NURSE PRACTITIONER

## 2024-05-15 PROCEDURE — 82947 ASSAY GLUCOSE BLOOD QUANT: CPT

## 2024-05-15 PROCEDURE — 1240000001 HC SEMI-PRIVATE BH ROOM DAILY

## 2024-05-15 PROCEDURE — 2500000001 HC RX 250 WO HCPCS SELF ADMINISTERED DRUGS (ALT 637 FOR MEDICARE OP): Performed by: PSYCHIATRY & NEUROLOGY

## 2024-05-15 PROCEDURE — 97150 GROUP THERAPEUTIC PROCEDURES: CPT | Mod: GO,CO

## 2024-05-15 PROCEDURE — 99233 SBSQ HOSP IP/OBS HIGH 50: CPT | Performed by: PSYCHIATRY & NEUROLOGY

## 2024-05-15 RX ADMIN — METOPROLOL SUCCINATE 100 MG: 50 TABLET, EXTENDED RELEASE ORAL at 08:46

## 2024-05-15 RX ADMIN — TRIAMCINOLONE ACETONIDE: 1 OINTMENT TOPICAL at 08:45

## 2024-05-15 RX ADMIN — ATORVASTATIN CALCIUM 80 MG: 80 TABLET, FILM COATED ORAL at 08:46

## 2024-05-15 RX ADMIN — ASPIRIN 81 MG: 81 TABLET, COATED ORAL at 08:46

## 2024-05-15 RX ADMIN — TRIAMCINOLONE ACETONIDE: 1 OINTMENT TOPICAL at 20:23

## 2024-05-15 RX ADMIN — SERTRALINE HYDROCHLORIDE 150 MG: 50 TABLET ORAL at 08:46

## 2024-05-15 RX ADMIN — METFORMIN HYDROCHLORIDE 1000 MG: 500 TABLET, EXTENDED RELEASE ORAL at 20:23

## 2024-05-15 RX ADMIN — INSULIN GLARGINE 30 UNITS: 100 INJECTION, SOLUTION SUBCUTANEOUS at 20:23

## 2024-05-15 RX ADMIN — GABAPENTIN 300 MG: 300 CAPSULE ORAL at 08:46

## 2024-05-15 RX ADMIN — METFORMIN HYDROCHLORIDE 1000 MG: 500 TABLET, EXTENDED RELEASE ORAL at 08:45

## 2024-05-15 ASSESSMENT — PAIN SCALES - GENERAL
PAINLEVEL_OUTOF10: 0 - NO PAIN
PAINLEVEL_OUTOF10: 0 - NO PAIN

## 2024-05-15 ASSESSMENT — PAIN - FUNCTIONAL ASSESSMENT
PAIN_FUNCTIONAL_ASSESSMENT: 0-10
PAIN_FUNCTIONAL_ASSESSMENT: 0-10

## 2024-05-15 NOTE — NURSING NOTE
"The pt was calm and cooperative during the interview that took place at the end of the kenney away from her peers for privacy. The pt rated her anxiety a 2-3/10, depression 5/10, denies SI, HI and AVH at this time as well as pain rating it a 0/10. Last bowel movement was, \"yesterday\" 5/14/24. Coping skills, \"Trying to read a book but my eyes don't want to cooperative and groups.\" Goal for the day, \"Do some more groups. Continue getting to know my peers and stay in bed less.\" Pt strength, \"I like to learn and know things.\" The pt was medication compliant with his morning medications. Q 15 minute monitoring continued.   "

## 2024-05-15 NOTE — NURSING NOTE
Pt took his night time medications  Pt talked with other patients in the front lounge Pt went to bed and slept all night long

## 2024-05-15 NOTE — GROUP NOTE
"Group Topic: Leisure Skills   Group Date: 5/15/2024  Start Time: 1500  End Time: 1600  Facilitators: JEN Gusman   Department: [facility] REHAB THERAPY VIRTUAL    Number of Participants: 8   Group Focus: concentration and leisure skills  Treatment Modality: Recreation Therapy  Interventions utilized were: The Game of Things..., leisure development and problem solving  Purpose: social/cognitive stimulation, pleasurable leisure, communication skills and insight or knowledge    Name: Josué Pastor YOB: 1965   MR: [id number]      Facilitator: Recreational Therapist  Level of Participation: active  Quality of Participation: appropriate/pleasant, cooperative, and engaged  Interactions with others: appropriate  Mood/Affect: appropriate and positive  Triggers (if applicable): N/A  Cognition: coherent/clear and logical  Progress: Moderate  Comments: This session involved a leisure activity called \"The Game of Things\".  The activity involved cognitive tasks, social interactions, healthy competition, leisure awareness, and a pleasurable experience. All participants were encouraged to listen to the rules, ask questions as needed, and participate in the activity to the best of their abilities.    Patient attended the entire session and independently completed all group tasks. He appeared to enjoy the activity and the company of his peers.     Plan: continue with services      "

## 2024-05-15 NOTE — CARE PLAN
"The patient's goals for the shift include \"Do some more groups, continue to get to know my peers, stay in bed less.\"    The clinical goals for the shift include maintain safety    Over the shift, the patient did not make progress toward the following goals. The pt had an uneventful day. The pt did attend groups and was observed interacting with his fellow peers. Q 15 minute monitoring continued.     "

## 2024-05-15 NOTE — GROUP NOTE
Group Topic: Feeling Awareness/Expression   Group Date: 5/15/2024  Start Time: 1400  End Time: 1500  Facilitators: MARLEY GusmanS   Department: [facility] REHAB THERAPY VIRTUAL    Number of Participants: 9   Group Focus: feeling awareness/expression, healthy friendships, personal responsibility, self-awareness, and social skills  Treatment Modality: Recreation Therapy  Interventions utilized were: Living Skills/Team building, Kind Words Activity, Kindness Worksheet,  exploration, story telling, and support  Purpose: Values identification, feelings, communication skills, and self-worth    Name: Josué Pastor YOB: 1965   MR: [id number]      Facilitator: Recreational Therapist  Level of Participation: active  Quality of Participation: appropriate/pleasant, attentive, cooperative, and initiates communication  Interactions with others: appropriate and gave feedback  Mood/Affect: appropriate and positive  Triggers (if applicable): N/A  Cognition: coherent/clear, logical, and capable  Progress: Moderate  Comments: Participants were asked to utilize letters to create words that associate with “empathy”. We discussed the meaning of the word and how it related to health, relationships, communication, and recovery. CTRS informed the group that a minimum of nine words could be made from the provided letters. Patients were provided a “kindness worksheet” which included ten tasks related to kindness, empathy, and healthier communication to work on. Each task provided an instructions section and reflection area for personal notes. Participants were assisted in understanding the tasks and then encouraged to complete in the future.     Patient attended the entire session and appeared to fully understand all group discussions. He was able to assist his teammates during the kind words activity. Mr. Pastor shared a personal story about his love of reading and learning. He discussed the reason for his L forearm  "tattoo and how it related to \"affirmations and self-kindness\". Patient identified that he sometimes forget all the important leisure and coping strategies that he knows will aid him through his recovery process.     Plan: continue with services      "

## 2024-05-15 NOTE — PROGRESS NOTES
"Josué Pastor is a 58 y.o. year old male patient who is on U admission day 2.      Subjective   Josué Pastor is a 58 y.o. year old male patient who was personally seen and interviewed, and discussed in morning team rounds. The patient was interviewed alone at the end of the hallway (interviewed sitting in a chair), and was easily engaged and cooperative. This morning, Josué reports feeling \"better\" and currently rates his depression at a 2 out of 10. No current suicidal ideation or plans were elicited. He also rates his anxiety at a 2 out of 10. No hallucinations or paranoia were endorsed or noted.   Josué slept 8 hours last night (unbroken).         Objective   Mental Status Exam:   General: Appropriately groomed and dressed in casual/hospital attire.   Appearance: Appears stated age.   Attitude: Calm, cooperative.   Behavior: Appropriate eye contact.   Motor Activity: No agitation or retardation. No EPS/TD. Normal gait and station. Normal muscle tone and bulk.   Speech: Regular rate, rhythm, volume and tone, spontaneous, fluent. Non-pressured.   Mood: \"Better\"   Affect: Neutral-to-pleasant.   Thought Process: Organized, and goal directed.   Thought Content: Does not currently endorse suicidal ideation or any suicide plans.   Does not endorse homicidal ideation.  No overt delusions or paranoia elicited.    Thought Perception: Does not endorse auditory or visual hallucinations, does not appear to be responding to hallucinatory stimuli.   Cognition: Alert, oriented x 3. No deficits noted. Adequate fund of knowledge. No deficit in recent and remote memory. No deficits in attention, concentration or language.   Insight: Poor-to-Fair, as patient recognizes symptoms of illness and need for recommended treatments.    Judgment: Poor-to-Fair, as patient can make reasonable decisions about ordinary activities of daily living and necessary medical care recommendations.         LABS:  Results for orders placed " or performed during the hospital encounter of 05/13/24 (from the past 96 hour(s))   Glucose, Fasting   Result Value Ref Range    Glucose, Fasting 131 (H) 74 - 99 mg/dL   Lipid Panel   Result Value Ref Range    Cholesterol 134 0 - 199 mg/dL    HDL-Cholesterol 30.4 mg/dL    Cholesterol/HDL Ratio 4.4     LDL Calculated 68 <=99 mg/dL    VLDL 35 0 - 40 mg/dL    Triglycerides 177 (H) 0 - 149 mg/dL    Non HDL Cholesterol 104 0 - 149 mg/dL   Vitamin D 25-Hydroxy,Total (for eval of Vitamin D levels)   Result Value Ref Range    Vitamin D, 25-Hydroxy, Total 33 30 - 100 ng/mL   POCT GLUCOSE   Result Value Ref Range    POCT Glucose 149 (H) 74 - 99 mg/dL   POCT GLUCOSE   Result Value Ref Range    POCT Glucose 192 (H) 74 - 99 mg/dL   POCT GLUCOSE   Result Value Ref Range    POCT Glucose 147 (H) 74 - 99 mg/dL   POCT GLUCOSE   Result Value Ref Range    POCT Glucose 173 (H) 74 - 99 mg/dL        Last Recorded Vitals  Visit Vitals  /75 (BP Location: Right arm, Patient Position: Sitting)   Pulse 67   Temp 36.2 °C (97.2 °F) (Temporal)   Resp 18        Intake/Output last 3 Shifts:  No intake/output data recorded.    Relevant Results  Scheduled medications  aspirin, 81 mg, oral, Daily  atorvastatin, 80 mg, oral, Daily  gabapentin, 300 mg, oral, Daily  insulin glargine, 30 Units, subcutaneous, Nightly  metFORMIN XR, 1,000 mg, oral, BID  metoprolol succinate XL, 100 mg, oral, Daily  sertraline, 150 mg, oral, Daily  triamcinolone, , Topical, BID      Continuous medications     PRN medications  PRN medications: acetaminophen, alum-mag hydroxide-simeth, diphenhydrAMINE **OR** diphenhydrAMINE, haloperidol **OR** haloperidol lactate, hydrOXYzine pamoate, LORazepam **OR** LORazepam, melatonin, psyllium               Assessment/Plan   1) Other Specified Depressive Disorder       Plan: 1) Zoloft to 100 ->150 mg Qdaily (continue)                2) Group and milieu therapy  Discussed potential risks, benefits, and alternatives to medications  with patient, who consented to the above medications.     2) PTSD, chronic       Plan: 1) see above     3) Alcohol Use Disorder, moderate, dependence       Plan: 1) Refer to outpatient treatment program.      4) Diabetic Retinopathy        Plan: 1) IM service to follow and manage.     5) Coronary Artery Bypass Graft       Plan: 1) IM service to follow and manage.            Jason Louis MD

## 2024-05-15 NOTE — CARE PLAN
Discussed in Treatment team today;  Patient is stabilizing, attending groups, wants to get better, seems motivated in treatment;  Phoned patient's wife, Shiloh, p. [phone], left voice mail.  Sw to follow.

## 2024-05-15 NOTE — NURSING NOTE
"Pt interview in the patients room  Pt is getting ready to come out for his snack  Pt stated his day was \"Good\"  He rated his anxiety 2/10  depression 5/10  and denied everything else at this time  Pt stated his goal \"read more of my book\"  his strength \" I like to read\"  and his coping skill \"I ride my motorcycle\"  Pt is appropriate with his answers to the questions.    "

## 2024-05-15 NOTE — GROUP NOTE
"Group Topic: Cognitive Behavioral Therapy   Group Date: 5/15/2024  Start Time: 0730  End Time: 0810  Facilitators: JEN Gusman   Department: [facility] REHAB THERAPY VIRTUAL    Number of Participants: 7   Group Focus: change, check in, daily focus, and goals  Treatment Modality: Recreation Therapy  Interventions utilized were: Increasing Confidence to Change, clarification, orientation, and support  Purpose: identifying areas of change, setting goals towards change, insight or knowledge, self-worth, and self-care    Name: Josué Pastor YOB: 1965   MR: [id number]      Facilitator: Recreational Therapist  Level of Participation: active  Quality of Participation: attentive, cooperative, engaged, and initiates communication  Interactions with others: appropriate  Mood/Affect: appropriate and motivated  Triggers (if applicable): N/A  Cognition: coherent/clear, goal directed, and logical  Progress: Moderate  Comments: Group participants were asked to think about and discuss possible life changes, past successful change situations, and personal strengths that will assist in making a change.    Patient attended the entire session. He was willing to share his thoughts and ideas. Patient discussed enjoying \"3D printing\" as a leisure interest. Patient identified that he needs to work on \"sobriety\" and \"not drinking ETOH anymore\". He described only recently identifying and accepting that he is an alcoholic. Patient completed all desired session tasks.     Plan: continue with services      "

## 2024-05-16 LAB
GLUCOSE BLD MANUAL STRIP-MCNC: 121 MG/DL (ref 74–99)
GLUCOSE BLD MANUAL STRIP-MCNC: 152 MG/DL (ref 74–99)
GLUCOSE BLD MANUAL STRIP-MCNC: 165 MG/DL (ref 74–99)
GLUCOSE BLD MANUAL STRIP-MCNC: 166 MG/DL (ref 74–99)

## 2024-05-16 PROCEDURE — 82947 ASSAY GLUCOSE BLOOD QUANT: CPT

## 2024-05-16 PROCEDURE — 99233 SBSQ HOSP IP/OBS HIGH 50: CPT | Performed by: PSYCHIATRY & NEUROLOGY

## 2024-05-16 PROCEDURE — 97150 GROUP THERAPEUTIC PROCEDURES: CPT | Mod: GO,CO

## 2024-05-16 PROCEDURE — 2500000006 HC RX 250 W HCPCS SELF ADMINISTERED DRUGS (ALT 637 FOR ALL PAYERS): Performed by: PSYCHIATRY & NEUROLOGY

## 2024-05-16 PROCEDURE — 2500000002 HC RX 250 W HCPCS SELF ADMINISTERED DRUGS (ALT 637 FOR MEDICARE OP, ALT 636 FOR OP/ED): Performed by: NURSE PRACTITIONER

## 2024-05-16 PROCEDURE — 1240000001 HC SEMI-PRIVATE BH ROOM DAILY

## 2024-05-16 PROCEDURE — 2500000001 HC RX 250 WO HCPCS SELF ADMINISTERED DRUGS (ALT 637 FOR MEDICARE OP): Performed by: PSYCHIATRY & NEUROLOGY

## 2024-05-16 RX ADMIN — GABAPENTIN 300 MG: 300 CAPSULE ORAL at 08:22

## 2024-05-16 RX ADMIN — METFORMIN HYDROCHLORIDE 1000 MG: 500 TABLET, EXTENDED RELEASE ORAL at 20:58

## 2024-05-16 RX ADMIN — ATORVASTATIN CALCIUM 80 MG: 80 TABLET, FILM COATED ORAL at 08:22

## 2024-05-16 RX ADMIN — ASPIRIN 81 MG: 81 TABLET, COATED ORAL at 08:22

## 2024-05-16 RX ADMIN — METFORMIN HYDROCHLORIDE 1000 MG: 500 TABLET, EXTENDED RELEASE ORAL at 08:22

## 2024-05-16 RX ADMIN — TRIAMCINOLONE ACETONIDE: 1 OINTMENT TOPICAL at 20:58

## 2024-05-16 RX ADMIN — SERTRALINE HYDROCHLORIDE 150 MG: 50 TABLET ORAL at 08:22

## 2024-05-16 RX ADMIN — METOPROLOL SUCCINATE 100 MG: 50 TABLET, EXTENDED RELEASE ORAL at 08:22

## 2024-05-16 RX ADMIN — TRIAMCINOLONE ACETONIDE: 1 OINTMENT TOPICAL at 08:22

## 2024-05-16 RX ADMIN — INSULIN GLARGINE 30 UNITS: 100 INJECTION, SOLUTION SUBCUTANEOUS at 20:58

## 2024-05-16 ASSESSMENT — PAIN SCALES - GENERAL
PAINLEVEL_OUTOF10: 0 - NO PAIN
PAINLEVEL_OUTOF10: 0 - NO PAIN

## 2024-05-16 ASSESSMENT — PAIN - FUNCTIONAL ASSESSMENT
PAIN_FUNCTIONAL_ASSESSMENT: 0-10
PAIN_FUNCTIONAL_ASSESSMENT: 0-10

## 2024-05-16 NOTE — PROGRESS NOTES
"Occupational Therapy     REHAB Therapy Assessment & Treatment    Patient Name: Josué Pastor  MRN: [id number]  Today's Date: 5/16/2024      Activity Assessment:     Stress ID/ Symptom Management Group: [id number]  Healthy vs. Unhealthy Coping Skills Group: [id number]  Pet Therapy as a Coping Tool Group: [id number]    3/3 Groups attended       Pt present in all groups with continued G participation, improved comfort level sharing aloud, as well as improved insight. Pt continues to demo brighter affect and appropriate social interaction with peers. Pt with G understanding of stress education and is able to ID current stressor as \"loneliness\" Pt then with continued G understanding and importance of what he can take responsibility for as far as his stress and is able to hare aloud with healthy vs unhealthy coping mean to him in his own words. Pt with appropriate validation and feedback to his peers during same discussion, as well as able to create an appropriate list of healthy coping tools for home carry over sharing \"make an effort, better time management, view yourself as important/spiritual, and offer assistance to others\" Pt continues to make improvement toward OT goals as evidenced above. Pt would benefit from continued OT services in order to improve overall self-esteem, personal confidence and positive supports for safe transition at discharge.      Encounter Problems       Encounter Problems (Active)       OT Goals       Pt will explore and ID 1-2 strategies to manage stressors/symptoms of illness/ grief more effectively prior to discharge.  (Progressing)       Start:  05/14/24    Expected End:  06/04/24            Pt will ID/ utilize 1-2 ways to increase balance of activity/ re-involve self in functional daily routines/roles prior to discharge.  (Progressing)       Start:  05/14/24    Expected End:  06/04/24            Pt will explore/ ID 1-2 meaningful leisure activities to improve QOL for post discharge use. "  (Progressing)       Start:  05/14/24    Expected End:  06/04/24            Pt will ID 2-3 effective ways to distract from crisis and ID stressors/ personal symptoms of stress in order to improve management of stress during daily activities.  (Progressing)       Start:  05/14/24    Expected End:  06/04/24            Pt will ID 2 community resources/programs to join/attend after D/C to improve their support system (Progressing)       Start:  05/14/24    Expected End:  06/04/24                       Additional Comments:  HARRELL collaborated with patients nurse and charge nurse throughout the day to provide appropriate support and encouragement to attend groups. Pt up on unit when HARRELL left last group of the day. All needs met.

## 2024-05-16 NOTE — NURSING NOTE
"0800- The pt was calm and cooperative during the interview that took place in the pt's room away from his peers for privacy. The pt denies anxiety rating it a 0/10, rated anxiety a 5-6/10, denies SI, HI and AVH at this time as well as pain rating it a 0/10. Last bowel movement was, \"yesterday\" 5/15/24. Coping skills, \"Go for a walk.\" Goal for the day, \"go to groups.\" Pt strength, \"I like animals.\" The pt was medication compliant with his morning medications. Q 15 minute monitoring continued.     1300- The pt is requesting eye drops for dry eye. This nurse sent a secured message to the on call NP asking for an order for eye drops. This nurse will follow up.   "

## 2024-05-16 NOTE — CARE PLAN
"The patient's goals for the shift include \"play chess and get some sleep\"    The clinical goals for the shift include medication compliance    Over the shift, the patient did not make progress toward the following goals. Barriers to progression include lack of medication knowledge. Recommendations to address these barriers include more education on medications.    " Bill For Physics Consultation: Yes

## 2024-05-16 NOTE — CARE PLAN
"  Problem: Sensory Perceptual Alteration as Evidenced by  Goal: Participates in unit activities  Outcome: Progressing     Problem: Ineffective Coping  Goal: Identifies healthy coping skills  Outcome: Progressing   The patient's goals for the shift include \"go to groups.\"    The clinical goals for the shift include maintain safety    Over the shift, the patient did not make progress toward the following goals.     "

## 2024-05-16 NOTE — PROGRESS NOTES
"Josué Pastor is a 58 y.o. year old male patient who is on U admission day 3.      Subjective   Josué Pastor is a 58 y.o. year old male patient who was personally seen and interviewed, and discussed in morning team rounds. The patient was interviewed alone at the end of the hallway (interviewed sitting in a chair), and was easily engaged and cooperative. This morning, Josué reports feeling \"okay\" and currently rates his depression at a 2-3 out of 10. He reports having suicidal ideation (\"not wanting to be here... on this planet) along with possible suicide plans to overdose on pills and insulin. He also rates his anxiety at a 4-5 out of 10. No hallucinations or paranoia were endorsed or noted.   Josué slept 5.5 hours last night (broken).         Objective   Mental Status Exam:   General: Appropriately groomed and dressed in casual/hospital attire.   Appearance: Appears stated age.   Attitude: Calm, cooperative.   Behavior: Appropriate eye contact.   Motor Activity: No agitation or retardation. No EPS/TD. Normal gait and station. Normal muscle tone and bulk.   Speech: Regular rate, rhythm, volume and tone, spontaneous, fluent. Non-pressured.   Mood: \"Okay\"   Affect: Neutral.   Thought Process: Organized, and goal directed.   Thought Content: Does currently endorse suicidal ideation with a possible  suicide plan to overdose.   Does not endorse homicidal ideation.  No overt delusions or paranoia elicited.    Thought Perception: Does not endorse auditory or visual hallucinations, does not appear to be responding to hallucinatory stimuli.   Cognition: Alert, oriented x 3. No deficits noted. Adequate fund of knowledge. No deficit in recent and remote memory. No deficits in attention, concentration or language.   Insight: Poor-to-Fair, as patient recognizes symptoms of illness and need for recommended treatments.    Judgment: Poor, as patient can make not necessarily make reasonable decisions about ordinary " activities of daily living and necessary medical care recommendations.         LABS:  Results for orders placed or performed during the hospital encounter of 05/13/24 (from the past 96 hour(s))   Glucose, Fasting   Result Value Ref Range    Glucose, Fasting 131 (H) 74 - 99 mg/dL   Lipid Panel   Result Value Ref Range    Cholesterol 134 0 - 199 mg/dL    HDL-Cholesterol 30.4 mg/dL    Cholesterol/HDL Ratio 4.4     LDL Calculated 68 <=99 mg/dL    VLDL 35 0 - 40 mg/dL    Triglycerides 177 (H) 0 - 149 mg/dL    Non HDL Cholesterol 104 0 - 149 mg/dL   Vitamin D 25-Hydroxy,Total (for eval of Vitamin D levels)   Result Value Ref Range    Vitamin D, 25-Hydroxy, Total 33 30 - 100 ng/mL   POCT GLUCOSE   Result Value Ref Range    POCT Glucose 149 (H) 74 - 99 mg/dL   POCT GLUCOSE   Result Value Ref Range    POCT Glucose 192 (H) 74 - 99 mg/dL   POCT GLUCOSE   Result Value Ref Range    POCT Glucose 147 (H) 74 - 99 mg/dL   POCT GLUCOSE   Result Value Ref Range    POCT Glucose 173 (H) 74 - 99 mg/dL   POCT GLUCOSE   Result Value Ref Range    POCT Glucose 207 (H) 74 - 99 mg/dL   POCT GLUCOSE   Result Value Ref Range    POCT Glucose 208 (H) 74 - 99 mg/dL   POCT GLUCOSE   Result Value Ref Range    POCT Glucose 121 (H) 74 - 99 mg/dL   POCT GLUCOSE   Result Value Ref Range    POCT Glucose 166 (H) 74 - 99 mg/dL        Last Recorded Vitals  Visit Vitals  /82   Pulse 71   Temp 36.1 °C (97 °F) (Temporal)   Resp 16        Intake/Output last 3 Shifts:  No intake/output data recorded.    Relevant Results  Scheduled medications  aspirin, 81 mg, oral, Daily  atorvastatin, 80 mg, oral, Daily  gabapentin, 300 mg, oral, Daily  insulin glargine, 30 Units, subcutaneous, Nightly  metFORMIN XR, 1,000 mg, oral, BID  metoprolol succinate XL, 100 mg, oral, Daily  sertraline, 150 mg, oral, Daily  triamcinolone, , Topical, BID      Continuous medications     PRN medications  PRN medications: acetaminophen, alum-mag hydroxide-simeth, diphenhydrAMINE **OR**  diphenhydrAMINE, haloperidol **OR** haloperidol lactate, hydrOXYzine pamoate, LORazepam **OR** LORazepam, melatonin, psyllium               Assessment/Plan   1) Other Specified Depressive Disorder       Plan: 1) Zoloft to 100 ->150 mg Qdaily (continue)                2) Group and milieu therapy  Discussed potential risks, benefits, and alternatives to medications with patient, who consented to the above medications.     2) PTSD, chronic       Plan: 1) see above     3) Alcohol Use Disorder, moderate, dependence       Plan: 1) Refer to outpatient treatment program.      4) Diabetic Retinopathy        Plan: 1) IM service to follow and manage.     5) Coronary Artery Bypass Graft       Plan: 1) IM service to follow and manage.            Jason Louis MD
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
## 2024-05-16 NOTE — NURSING NOTE
Pt took his night time medications  He played chess with another patient and went to bed  Pt slept all night long Pt had an uneventful night

## 2024-05-16 NOTE — GROUP NOTE
"Group Topic: Goals   Group Date: 5/16/2024  Start Time: 0730  End Time: 0810  Facilitators: JEN Gusman   Department: [facility] REHAB THERAPY VIRTUAL    Number of Participants: 7   Group Focus: check in, goals, and other daily reading  Treatment Modality: Recreation Therapy  Interventions utilized were: Thought for the Day, Practicing Patience,  exploration and support  Purpose: Goal identification, coping skills, insight or knowledge, and self-care    Name: Josué Pastor YOB: 1965   MR: [id number]      Facilitator: Recreational Therapist  Level of Participation: did not attend  Progress: None  Comments: Morning group included reviewing a Thought for the Day and discussing the topic of “Practicing Patience”.  The quote related to the reading was \"If I put my emotional, physical, and spiritual health first, and practice patience, the rest will follow”. Patient participants were encouraged to reflect on the reading, share thoughts/opinions, and establish a goal connected to the concept being discussed.    Patient remained in their room throughout the session appearing to be resting/sleeping.     Plan: continue with services      "

## 2024-05-16 NOTE — GROUP NOTE
Group Topic: Dialectical Behavioral Therapy - Mindfulness   Group Date: 5/16/2024  Start Time: 1600  End Time: 1650  Facilitators: JEN Gusman   Department: [facility] REHAB THERAPY VIRTUAL    Number of Participants: 6   Group Focus: coping skills, mindfulness, and self-awareness  Treatment Modality: Recreation Therapy  Interventions utilized were: Alphabet Warm-up, DBT-Mindfulness How Skills (Nonjudgmentalness and One-Mindfulness) exploration and support  Purpose: coping skills, maladaptive thinking, and insight or knowledge    Name: Josué Pastor YOB: 1965   MR: [id number]      Facilitator: Recreational Therapist  Level of Participation: moderate  Quality of Participation: attentive and cooperative  Interactions with others: appropriate  Mood/Affect: appropriate and calm  Triggers (if applicable): N/A  Cognition: coherent/clear, goal directed, and logical  Progress: Moderate  Comments: The HOW skills related to mindfulness development were discussed.  We focused on decreasing judgmental ideas, staying one-mindfully, and effectiveness.  Additional time was spent on working at improving non-judgmentalness and staying one mindfully.      Patient attended the entire session and completed all group tasks. He sat with his eyes closed, appearing to be resting them, for part of the group. Patient communicated his thoughts when needed and appeared to understand the information being discussed.     Plan: continue with services

## 2024-05-16 NOTE — NURSING NOTE
"[place] [facility]  [place] is sitting talking with other patients  Pt stated he \"had a good day\"  He rated his anxiety 2/10  depression 2/10 and denied everything else at this time  Pt stated his goal \" play chess and get sleep\"  his strength \" I play games\"  and his coping skill \"I ride my motorcycle\"  Pt is  blind in his right eye  Pt said he doesn't drive a car anymore but in [place] you need only one eye to ride a motorcycle  Pt is appropriate with his answers to the questions at this time   "

## 2024-05-16 NOTE — GROUP NOTE
"Group Topic: Gross Motor/Balance Skills   Group Date: 5/16/2024  Start Time: 1400  End Time: 1515  Facilitators: JEN Gusman   Department: [facility] REHAB THERAPY VIRTUAL    Number of Participants: 8   Group Focus: Physical movement, leisure skills  Treatment Modality: Recreation Therapy  Interventions utilized were: Full Body Seated Stretching, Pop Darts, Music, group exercise and leisure development  Purpose: Physical health/movement, Physical leisure activity, coping skills and self-care    Name: Josué Pastor YOB: 1965   MR: [id number]      Facilitator: Recreational Therapist  Level of Participation: active  Quality of Participation: appropriate/pleasant, cooperative, and engaged  Interactions with others: appropriate  Mood/Affect: appropriate and positive  Triggers (if applicable): N/A  Cognition: coherent/clear, logical, and capable  Progress: Moderate  Comments: Stretching exercises included three different stretches in each category which included neck/shoulders, shoulders/arms, upper body/back, and lower extremity. Participants were explained each movement, provided a demonstration, adapted for performance level, and reminded to stop if feeling discomfort and/or pain. The physical leisure activity \"Pop Darts\" involved coordinating movements, social interactions, healthy competition, leisure awareness, and a pleasurable experience.    Patient completed about 80% of the stretches/exercises and was provided some movement adaptations for his LE's. He fully participated in the leisure activity and appeared to enjoy the game. Patient was observed dancing and laughing with his peers.     Plan: continue with services      "

## 2024-05-17 LAB
GLUCOSE BLD MANUAL STRIP-MCNC: 145 MG/DL (ref 74–99)
GLUCOSE BLD MANUAL STRIP-MCNC: 155 MG/DL (ref 74–99)
GLUCOSE BLD MANUAL STRIP-MCNC: 168 MG/DL (ref 74–99)
GLUCOSE BLD MANUAL STRIP-MCNC: 198 MG/DL (ref 74–99)

## 2024-05-17 PROCEDURE — 2500000001 HC RX 250 WO HCPCS SELF ADMINISTERED DRUGS (ALT 637 FOR MEDICARE OP): Performed by: PSYCHIATRY & NEUROLOGY

## 2024-05-17 PROCEDURE — 99233 SBSQ HOSP IP/OBS HIGH 50: CPT | Performed by: PSYCHIATRY & NEUROLOGY

## 2024-05-17 PROCEDURE — 2500000005 HC RX 250 GENERAL PHARMACY W/O HCPCS: Performed by: NURSE PRACTITIONER

## 2024-05-17 PROCEDURE — 2500000002 HC RX 250 W HCPCS SELF ADMINISTERED DRUGS (ALT 637 FOR MEDICARE OP, ALT 636 FOR OP/ED): Performed by: NURSE PRACTITIONER

## 2024-05-17 PROCEDURE — 82947 ASSAY GLUCOSE BLOOD QUANT: CPT

## 2024-05-17 PROCEDURE — 2500000006 HC RX 250 W HCPCS SELF ADMINISTERED DRUGS (ALT 637 FOR ALL PAYERS): Performed by: PSYCHIATRY & NEUROLOGY

## 2024-05-17 PROCEDURE — 97150 GROUP THERAPEUTIC PROCEDURES: CPT | Mod: GO,CO

## 2024-05-17 PROCEDURE — 1240000001 HC SEMI-PRIVATE BH ROOM DAILY

## 2024-05-17 RX ORDER — SERTRALINE HYDROCHLORIDE 100 MG/1
200 TABLET, FILM COATED ORAL DAILY
Status: DISCONTINUED | OUTPATIENT
Start: 2024-05-18 | End: 2024-05-22 | Stop reason: HOSPADM

## 2024-05-17 RX ADMIN — ASPIRIN 81 MG: 81 TABLET, COATED ORAL at 08:41

## 2024-05-17 RX ADMIN — GABAPENTIN 300 MG: 300 CAPSULE ORAL at 08:41

## 2024-05-17 RX ADMIN — CARBOXYMETHYLCELLULOSE SODIUM 1 DROP: 5 SOLUTION/ DROPS OPHTHALMIC at 15:08

## 2024-05-17 RX ADMIN — ATORVASTATIN CALCIUM 80 MG: 80 TABLET, FILM COATED ORAL at 08:41

## 2024-05-17 RX ADMIN — METOPROLOL SUCCINATE 100 MG: 50 TABLET, EXTENDED RELEASE ORAL at 08:41

## 2024-05-17 RX ADMIN — TRIAMCINOLONE ACETONIDE 1 APPLICATION: 1 OINTMENT TOPICAL at 21:27

## 2024-05-17 RX ADMIN — SERTRALINE HYDROCHLORIDE 175 MG: 50 TABLET ORAL at 08:41

## 2024-05-17 RX ADMIN — INSULIN GLARGINE 30 UNITS: 100 INJECTION, SOLUTION SUBCUTANEOUS at 21:27

## 2024-05-17 RX ADMIN — TRIAMCINOLONE ACETONIDE: 1 OINTMENT TOPICAL at 08:41

## 2024-05-17 RX ADMIN — METFORMIN HYDROCHLORIDE 1000 MG: 500 TABLET, EXTENDED RELEASE ORAL at 08:41

## 2024-05-17 RX ADMIN — METFORMIN HYDROCHLORIDE 1000 MG: 500 TABLET, EXTENDED RELEASE ORAL at 21:27

## 2024-05-17 ASSESSMENT — PAIN SCALES - GENERAL
PAINLEVEL_OUTOF10: 0 - NO PAIN
PAINLEVEL_OUTOF10: 0 - NO PAIN

## 2024-05-17 ASSESSMENT — PAIN - FUNCTIONAL ASSESSMENT: PAIN_FUNCTIONAL_ASSESSMENT: 0-10

## 2024-05-17 NOTE — NURSING NOTE
"[facility]  Pt is getting ready to watch a movie  Pt is calm and stated his day was \"good\"  Pt rated his anxiety 0/10  depression 0/10 and denied everything else at this time  Pt stated his goal \" get sleep\"  his strength I write\"  and his coping skill \"I ride my motorcycle\" Pt  is appropriate with his answers to the questions at this time  "

## 2024-05-17 NOTE — CARE PLAN
"The patient's goals for the shift include \"share more\"    The clinical goals for the shift include medication compliance    Over the shift, the patient did make progress toward the following goals. Barriers to progression include no barriers. Recommendations to address these barriers include educate patient on prescribed medications.    "

## 2024-05-17 NOTE — NURSING NOTE
"Patient out on the unit and social with peers this shift. Rated anxiety 0/10 and depression 0/10. Denied SI/HI. Denied auditory/visual/other hallucinations. No complaints of pain or discomfort. Patient attended group therapy this shift. Medication compliant. PRN eye drops administered this shift. Able to state positive coping skills such as \"reading\". Patient has been pleasant and cooperative with staff. Q 15 minute checks to be maintained throughout shift for safety.    "

## 2024-05-17 NOTE — PROGRESS NOTES
"Josué Pastor is a 58 y.o. year old male patient who is on U admission day 4.      Subjective   Josué Pastor is a 58 y.o. year old male patient who was personally seen and interviewed, and discussed in morning team rounds. The patient was interviewed at the end of the hallway (interviewed sitting in a chair) along with  Tangela, and was easily engaged and cooperative. This morning, Josué reports feeling \"better\" and currently rates his depression at a 1 out of 10. He denies having suicidal ideation or suicide plans this morning. He also rates his anxiety at a 2-3 out of 10. No hallucinations or paranoia were endorsed or noted.   Josué slept 7.25 hours last night (unbroken).         Objective   Mental Status Exam:   General: Appropriately groomed and dressed in casual/hospital attire.   Appearance: Appears stated age.   Attitude: Calm, cooperative.   Behavior: Appropriate eye contact.   Motor Activity: No agitation or retardation. No EPS/TD. Normal gait and station. Normal muscle tone and bulk.   Speech: Regular rate, rhythm, volume and tone, spontaneous, fluent. Non-pressured.   Mood: \"Better\"   Affect: Neutral.   Thought Process: Organized, and goal directed.   Thought Content: Does not currently endorse suicidal ideation or having a suicide plan to overdose (but did yesterday).   Does not endorse homicidal ideation.  No overt delusions or paranoia elicited.    Thought Perception: Does not endorse auditory or visual hallucinations, does not appear to be responding to hallucinatory stimuli.   Cognition: Alert, oriented x 3. No deficits noted. Adequate fund of knowledge. No deficit in recent and remote memory. No deficits in attention, concentration or language.   Insight: Poor-to-Fair, as patient recognizes symptoms of illness and need for recommended treatments.    Judgment: Poor, as patient can make not necessarily make reasonable decisions about ordinary activities of daily living and " necessary medical care recommendations.         LABS:  Results for orders placed or performed during the hospital encounter of 05/13/24 (from the past 96 hour(s))   Glucose, Fasting   Result Value Ref Range    Glucose, Fasting 131 (H) 74 - 99 mg/dL   Lipid Panel   Result Value Ref Range    Cholesterol 134 0 - 199 mg/dL    HDL-Cholesterol 30.4 mg/dL    Cholesterol/HDL Ratio 4.4     LDL Calculated 68 <=99 mg/dL    VLDL 35 0 - 40 mg/dL    Triglycerides 177 (H) 0 - 149 mg/dL    Non HDL Cholesterol 104 0 - 149 mg/dL   Vitamin D 25-Hydroxy,Total (for eval of Vitamin D levels)   Result Value Ref Range    Vitamin D, 25-Hydroxy, Total 33 30 - 100 ng/mL   POCT GLUCOSE   Result Value Ref Range    POCT Glucose 149 (H) 74 - 99 mg/dL   POCT GLUCOSE   Result Value Ref Range    POCT Glucose 192 (H) 74 - 99 mg/dL   POCT GLUCOSE   Result Value Ref Range    POCT Glucose 147 (H) 74 - 99 mg/dL   POCT GLUCOSE   Result Value Ref Range    POCT Glucose 173 (H) 74 - 99 mg/dL   POCT GLUCOSE   Result Value Ref Range    POCT Glucose 207 (H) 74 - 99 mg/dL   POCT GLUCOSE   Result Value Ref Range    POCT Glucose 208 (H) 74 - 99 mg/dL   POCT GLUCOSE   Result Value Ref Range    POCT Glucose 121 (H) 74 - 99 mg/dL   POCT GLUCOSE   Result Value Ref Range    POCT Glucose 166 (H) 74 - 99 mg/dL   POCT GLUCOSE   Result Value Ref Range    POCT Glucose 152 (H) 74 - 99 mg/dL   POCT GLUCOSE   Result Value Ref Range    POCT Glucose 165 (H) 74 - 99 mg/dL   POCT GLUCOSE   Result Value Ref Range    POCT Glucose 145 (H) 74 - 99 mg/dL        Last Recorded Vitals  Visit Vitals  /83 (BP Location: Left arm, Patient Position: Sitting)   Pulse 65   Temp 36.4 °C (97.5 °F) (Temporal)   Resp 16        Intake/Output last 3 Shifts:  No intake/output data recorded.    Relevant Results  Scheduled medications  aspirin, 81 mg, oral, Daily  atorvastatin, 80 mg, oral, Daily  gabapentin, 300 mg, oral, Daily  insulin glargine, 30 Units, subcutaneous, Nightly  metFORMIN XR,  1,000 mg, oral, BID  metoprolol succinate XL, 100 mg, oral, Daily  sertraline, 175 mg, oral, Daily  triamcinolone, , Topical, BID      Continuous medications     PRN medications  PRN medications: acetaminophen, alum-mag hydroxide-simeth, diphenhydrAMINE **OR** diphenhydrAMINE, haloperidol **OR** haloperidol lactate, hydrOXYzine pamoate, LORazepam **OR** LORazepam, artificial tears, melatonin, psyllium               Assessment/Plan   1) Other Specified Depressive Disorder       Plan: *1) Zoloft to 100 ->150 -> 175 mg Qdaily (continue)                2) Group and milieu therapy  Discussed potential risks, benefits, and alternatives to medications with patient, who consented to the above medications.     2) PTSD, chronic       Plan: 1) see above     3) Alcohol Use Disorder, moderate, dependence       Plan: 1) Refer to outpatient treatment program.      4) Diabetic Retinopathy        Plan: 1) IM service to follow and manage.     5) Coronary Artery Bypass Graft       Plan: 1) IM service to follow and manage.            Jason Louis MD

## 2024-05-17 NOTE — GROUP NOTE
Group Topic: Leisure Skills   Group Date: 5/17/2024  Start Time: 1600  End Time: 1700  Facilitators: JEN Gusman   Department: [facility] REHAB THERAPY VIRTUAL    Number of Participants: 5   Group Focus: leisure skills, educational, relaxation   Treatment Modality: Recreation Therapy  Interventions utilized were: Virtual Video Museum Tours ([employer], and [employer]/[place]), exploration, leisure development, mental fitness, and reminiscence  Purpose: Leisure Awareness, Cognitive Stimulation, Relaxation     Name: Josué Pastor YOB: 1965   MR: [id number]      Facilitator: Recreational Therapist  Level of Participation: active  Quality of Participation: appropriate/pleasant  Interactions with others: appropriate  Mood/Affect: appropriate  Triggers (if applicable): N/A  Cognition: coherent/clear  Progress: Moderate  Comments: This session involved a leisure activity in which the group was provided education and assistance viewing virtual/video museum tours. We watched videos that provided guided tours while at the [employer] and also viewed a walking tour of the [place] [employer]. The activity involved cognitive tasks, social interactions, leisure awareness, and a pleasurable experience. All participants were encouraged to relax, watch, listen, and provide feedback.     Patient has a passion for knowledge and learning through reading, technology, and visiting museums. He discussed experiences and information that has sparked his interest over the years. He especially enjoys [place] history/information.     Plan: continue with services

## 2024-05-17 NOTE — NURSING NOTE
Pt watched the movie  He took his night time medications and then went to bed  Pt slept all night long  Pt had an uneventful night

## 2024-05-17 NOTE — GROUP NOTE
"Group Topic: Self-Care/Wellness   Group Date: 5/17/2024  Start Time: 0730  End Time: 0810  Facilitators: JEN Gusman   Department: [facility] REHAB THERAPY VIRTUAL    Number of Participants: 6   Group Focus: check in, coping skills, daily focus, and goals  Treatment Modality: Recreation Therapy  Interventions utilized were: 12 Steps to Happiness, Goals, orientation and support  Purpose: coping skills, insight or knowledge, self-worth, and self-care    Name: Josué Pastor YOB: 1965   MR: [id number]      Facilitator: Recreational Therapist  Level of Participation: active  Quality of Participation: appropriate/pleasant, attentive, cooperative, engaged, and initiates communication  Interactions with others: appropriate and supportive  Mood/Affect: appropriate and positive  Triggers (if applicable): N/A  Cognition: coherent/clear and logical  Progress: Moderate  Comments: This session focused on \"12 Steps to Happiness\". Participants were asked to reflect on each of the steps and share how they currently practice them or how they may set goals to incorporate them into routines. The steps included practicing thanks, give/receive, help, eat well, exercise, rest, experience leisure, hike, meditate, socialize, aim/set goals, and smile.      Mr. Pastor was social throughout the group and reflected on many of the happiness areas. He described having \"lost some of life's enjoyments\", and that it is important for him to \"get back involved\" in activities that has brought him alessandro. He shared trips that he has taken, flying model planes, and experiences that has provided him happiness. Patient completed all desired session tasks.     Plan: continue with services      "

## 2024-05-17 NOTE — PROGRESS NOTES
"Occupational Therapy     REHAB Therapy Assessment & Treatment    Patient Name: Josué Pastor  MRN: [id number]  Today's Date: 5/17/2024      Activity Assessment:      [place] Setting/Education and Importance of Setting Boundaries Group: [id number]  Yoga as a Mindfulness Technique Group: [id number]  Daily Balance of self care and responsibilties/ Impulse Control Group: [id number]    3/3 Groups attended     Pt present in all groups with continued improved mood/brighter affect, improved social interaction and encouragement to peers 100% of the time, and increased insight into topics and situation. Pt consisently malvin's lolly G leader role throughout all groups as well as observed appropriately encouraging/giving positive feedback to his peers. Pt malvin's G understanding of boundary setting education and is able to share what boundaries mean to him in his own words. Pt then provided a boundary setting handout sharing new needed boundaries to improve quality of life at discharge as \"I am not perfect, I will not , I will be more accepting, I can control myself\" Pt continues to malvin TONY engagement and understanding during daily balance task as well as observed writing this activity down stating \"I want to do this again with my family, this is definitely a good eye opening activity.\" Pt continues to make improvement toward OT goals as evidenced above. Pt would benefit from continued OT services in order to improve overall self-esteem, personal confidence and positive supports for safe transition at discharge.      Encounter Problems       Encounter Problems (Active)       OT Goals       Pt will explore and ID 1-2 strategies to manage stressors/symptoms of illness/ grief more effectively prior to discharge.  (Progressing)       Start:  05/14/24    Expected End:  06/04/24            Pt will ID/ utilize 1-2 ways to increase balance of activity/ re-involve self in functional daily routines/roles prior to discharge.  (Progressing) "       Start:  05/14/24    Expected End:  06/04/24            Pt will explore/ ID 1-2 meaningful leisure activities to improve QOL for post discharge use.  (Progressing)       Start:  05/14/24    Expected End:  06/04/24            Pt will ID 2-3 effective ways to distract from crisis and ID stressors/ personal symptoms of stress in order to improve management of stress during daily activities.  (Progressing)       Start:  05/14/24    Expected End:  06/04/24            Pt will ID 2 community resources/programs to join/attend after D/C to improve their support system (Progressing)       Start:  05/14/24    Expected End:  06/04/24                       Additional Comments:    HARRELL collaborated with patients nurse and charge nurse throughout the day to provide appropriate support and encouragement to attend groups. Pt up on unit when HARRELL left last group of the day. All needs met.

## 2024-05-17 NOTE — CARE PLAN
Met with patient today along with Attending; he stated he is doing better, that he is getting a lot out of groups, very talkative;  Discussed in Treatment team today: Attending stated patient was voicing suicide yesterday;  no discharge date yet;  Sw to follow.

## 2024-05-17 NOTE — GROUP NOTE
"Group Topic: Feeling Awareness/Expression   Group Date: 5/17/2024  Start Time: 1400  End Time: 1500  Facilitators: JEN Gusman   Department: [facility] REHAB THERAPY VIRTUAL    Number of Participants: 6   Group Focus: anger management, coping skills, and feeling awareness/expression  Treatment Modality: Recreation Therapy  Interventions utilized were: Living Skills Expressing Emotions, Anger/Feelings Cards, exploration, patient education, and support  Purpose: coping skills, feelings, communication skills, and insight or knowledge    Name: Josué Pastor YOB: 1965   MR: [id number]      Facilitator: Recreational Therapist  Level of Participation: active  Quality of Participation: appropriate/pleasant, attentive, cooperative, and engaged  Interactions with others: appropriate  Mood/Affect: appropriate and positive  Triggers (if applicable): N/A  Cognition: coherent/clear and logical  Progress: Moderate  Comments: The group completed a warm up activity choosing a feelings card (feelings associated with anger) and were asked to explain how someone may express the feelings (actions, words, relationships, etc.). We had dialogue and reviewed a handout around the living skill of interpersonal abilities.  The handouts included:  expressing emotions, positive ways to express anger, and challenging oneself to identify others who display an ability to manage emotions well.    Patient was participative and willing to share his thoughts. He described events prior to admission which caused anger for he and his wife. He understands and accepts that \"life changes\" need to occur. Patient identified that he is worried that he may be okay because he is currently \"distracted with unit activities\" and when discharged \"things may get worse\". We discussed the mindset required for recovery as a group. The focus being planning and preparing to manage symptoms and issues verse attempting to cure them. Patient was " receptive to the discussions.     Plan: continue with services

## 2024-05-18 LAB
GLUCOSE BLD MANUAL STRIP-MCNC: 130 MG/DL (ref 74–99)
GLUCOSE BLD MANUAL STRIP-MCNC: 166 MG/DL (ref 74–99)
GLUCOSE BLD MANUAL STRIP-MCNC: 180 MG/DL (ref 74–99)
GLUCOSE BLD MANUAL STRIP-MCNC: 183 MG/DL (ref 74–99)

## 2024-05-18 PROCEDURE — 82947 ASSAY GLUCOSE BLOOD QUANT: CPT

## 2024-05-18 PROCEDURE — 2500000005 HC RX 250 GENERAL PHARMACY W/O HCPCS: Performed by: NURSE PRACTITIONER

## 2024-05-18 PROCEDURE — 2500000002 HC RX 250 W HCPCS SELF ADMINISTERED DRUGS (ALT 637 FOR MEDICARE OP, ALT 636 FOR OP/ED): Performed by: NURSE PRACTITIONER

## 2024-05-18 PROCEDURE — 2500000001 HC RX 250 WO HCPCS SELF ADMINISTERED DRUGS (ALT 637 FOR MEDICARE OP): Performed by: PSYCHIATRY & NEUROLOGY

## 2024-05-18 PROCEDURE — 2500000006 HC RX 250 W HCPCS SELF ADMINISTERED DRUGS (ALT 637 FOR ALL PAYERS): Performed by: PSYCHIATRY & NEUROLOGY

## 2024-05-18 PROCEDURE — 99233 SBSQ HOSP IP/OBS HIGH 50: CPT | Performed by: PSYCHIATRY & NEUROLOGY

## 2024-05-18 PROCEDURE — 1240000001 HC SEMI-PRIVATE BH ROOM DAILY

## 2024-05-18 RX ADMIN — GABAPENTIN 300 MG: 300 CAPSULE ORAL at 08:35

## 2024-05-18 RX ADMIN — SERTRALINE HYDROCHLORIDE 200 MG: 100 TABLET ORAL at 08:34

## 2024-05-18 RX ADMIN — TRIAMCINOLONE ACETONIDE: 1 OINTMENT TOPICAL at 08:36

## 2024-05-18 RX ADMIN — CARBOXYMETHYLCELLULOSE SODIUM 1 DROP: 5 SOLUTION/ DROPS OPHTHALMIC at 09:08

## 2024-05-18 RX ADMIN — METOPROLOL SUCCINATE 100 MG: 50 TABLET, EXTENDED RELEASE ORAL at 08:35

## 2024-05-18 RX ADMIN — ASPIRIN 81 MG: 81 TABLET, COATED ORAL at 08:34

## 2024-05-18 RX ADMIN — METFORMIN HYDROCHLORIDE 1000 MG: 500 TABLET, EXTENDED RELEASE ORAL at 08:35

## 2024-05-18 RX ADMIN — ATORVASTATIN CALCIUM 80 MG: 80 TABLET, FILM COATED ORAL at 08:36

## 2024-05-18 RX ADMIN — METFORMIN HYDROCHLORIDE 1000 MG: 500 TABLET, EXTENDED RELEASE ORAL at 20:42

## 2024-05-18 RX ADMIN — INSULIN GLARGINE 30 UNITS: 100 INJECTION, SOLUTION SUBCUTANEOUS at 20:41

## 2024-05-18 RX ADMIN — TRIAMCINOLONE ACETONIDE: 1 OINTMENT TOPICAL at 20:42

## 2024-05-18 ASSESSMENT — PAIN - FUNCTIONAL ASSESSMENT
PAIN_FUNCTIONAL_ASSESSMENT: 0-10
PAIN_FUNCTIONAL_ASSESSMENT: 0-10

## 2024-05-18 ASSESSMENT — PAIN SCALES - GENERAL
PAINLEVEL_OUTOF10: 0 - NO PAIN
PAINLEVEL_OUTOF10: 0 - NO PAIN

## 2024-05-18 NOTE — CARE PLAN
"The patient's goals for the shift include \" have a good night-movie and sleep\"    The clinical goals for the shift include medication compliance    Over the shift, the patient did  make progress toward the following goals.   Problem: Sensory Perceptual Alteration as Evidenced by  Goal: Cooperates with admission process  Outcome: Progressing  Goal: Patient/Family participate in treatment and discharge plans  Outcome: Progressing  Goal: Patient/Family verbalizes awareness of resources  Outcome: Progressing  Goal: Participates in unit activities  Outcome: Progressing  Goal: Discusses signs/symptoms of illness/treatment options  Outcome: Progressing  Goal: Initiates reality-based interactions  Outcome: Progressing  Goal: Able to discuss content of hallucinations/delusions  Outcome: Progressing  Goal: Notifies staff when experiencing hallucinations/delusions  Outcome: Progressing  Goal: Verbalizes reduction in hallucinations/delusions  Outcome: Progressing  Goal: Will not act on psychotic perception  Outcome: Progressing  Goal: Understands least restrictive measures  Outcome: Progressing  Goal: Free from restraint events  Outcome: Progressing     Problem: Altered Thought Processes as Evidenced by  Goal: STG - Desires improvement in ability to think and concentrate  Outcome: Progressing  Goal: STG - Participates in Occupational Therapy and other cognitive assessments  Outcome: Progressing     Problem: Potential for Harm to Self or Others  Goal: Cooperates with admission process  Outcome: Progressing  Goal: Participates in unit activities  Outcome: Progressing  Goal: Patient/Family participate in treatment and discharge plans  Outcome: Progressing  Goal: Identifies deescalation techniques  Outcome: Progressing  Goal: Understands least restrictive measures  Outcome: Progressing  Goal: Identifies stressors that lead to harmful behaviors  Outcome: Progressing  Goal: Notifies staff when experiencing harmful thoughts toward " self/others  Outcome: Progressing  Goal: Denies harm toward self or others  Outcome: Progressing  Goal: Free from restraint events  Outcome: Progressing     Problem: Educational/Scholastic Disruption  Goal: Meets educational requirements during hospitalization  Outcome: Progressing  Goal: Attends class without disruptive behavior  Outcome: Progressing  Goal: Completes daily assignments  Outcome: Progressing     Problem: Ineffective Coping  Goal: Cooperates with admission process  Outcome: Progressing  Goal: Identifies ineffective coping skills  Outcome: Progressing  Goal: Identifies healthy coping skills  Outcome: Progressing  Goal: Demonstrates healthy coping skills  Outcome: Progressing  Goal: Participates in unit activities  Outcome: Progressing  Goal: Patient/Family participate in treatment and discharge plans  Outcome: Progressing  Goal: Patient/Family verbalizes awareness of resources  Outcome: Progressing  Goal: Understands least restrictive measures  Outcome: Progressing  Goal: Free from restraint events  Outcome: Progressing     Problem: Alteration in Sleep  Goal: STG - Reports nightly sleep, duration, and quality  Outcome: Progressing  Goal: STG - Identifies sleep hygiene aids  Outcome: Progressing  Goal: STG - Informs staff if unable to sleep  Outcome: Progressing  Goal: STG - Attends breathing and relaxation group  Outcome: Progressing     Problem: Potential for Substance Withdrawal  Goal: Verbalizes signs/symptoms of withdrawal  Outcome: Progressing  Goal: Reports signs/symptoms of withdrawal  Outcome: Progressing  Goal: Free of withdrawal symptoms  Outcome: Progressing     Problem: Anxiety  Goal: Patient/family understands admission protocols  Outcome: Progressing  Goal: Attempts to manage anxiety with help  Outcome: Progressing  Goal: Verbalizes ways to manage anxiety  Outcome: Progressing  Goal: Implements measures to reduce anxiety  Outcome: Progressing  Goal: Free from restraint events  Outcome:  Progressing     Problem: Self Care Deficit  Goal: STG - Patient completes hygiene  Outcome: Progressing  Goal: Increase group attendance  Outcome: Progressing  Goal: Accepts need for medications  Outcome: Progressing  Goal: STG - Goes to and eats meals independently in dining room 100% of time  Outcome: Progressing     Problem: Defensive Coping  Goal: Cooperates with admission process  Outcome: Progressing  Goal: Identifies reckless/dangerous behavior  Outcome: Progressing  Goal: Identifies stressors that lead to reckless/dangerous behavior  Outcome: Progressing  Goal: Discusses and identifies healthy coping skills  Outcome: Progressing  Goal: Demonstrates healthy coping skills  Outcome: Progressing  Goal: Identifies appropriate social interaction  Outcome: Progressing  Goal: Demonstrates appropriate social interactions  Outcome: Progressing  Goal: Patient/Family verbalizes awareness of resources  Outcome: Progressing  Goal: Discusses signs/symptoms of illness/treatment options  Outcome: Progressing  Goal: Patient/Family participate in treatment and discharge plans  Outcome: Progressing  Goal: Understands least restrictive measures  Outcome: Progressing  Goal: Free from restraint events  Outcome: Progressing     Problem: Pain  Goal: Takes deep breaths with improved pain control throughout the shift  Outcome: Progressing  Goal: Turns in bed with improved pain control throughout the shift  Outcome: Progressing  Goal: Walks with improved pain control throughout the shift  Outcome: Progressing  Goal: Performs ADL's with improved pain control throughout shift  Outcome: Progressing  Goal: Participates in PT with improved pain control throughout the shift  Outcome: Progressing  Goal: Free from opioid side effects throughout the shift  Outcome: Progressing  Goal: Free from acute confusion related to pain meds throughout the shift  Outcome: Progressing

## 2024-05-18 NOTE — NURSING NOTE
"Patient sitting in dining room with peers watching a movie upon nursing assessment. A&Ox3 with VSS, Calm and cooperative. Denies anxiety,depression, SI/HI, VAT hallucinations, and physical pain. Last BM 5/17. Coping skill is \"reading\". Shift goal is to \"sleep well\". Medication compliant.   "

## 2024-05-18 NOTE — CARE PLAN
Patient is pleasant and cooperative. He is med compliant. PRN lubricating eye drops given 0908. During assessment he denies anxiety, depression, SI/HI, and AH/VH. He denies physical pain. ACHS BS, am 130, afternoon 166 patient takes 1000mg Metformin BID. His wife came to visit today. He is social with peers, and staff. He is currently playing game with peers. No groups today due to staffing.

## 2024-05-18 NOTE — PROGRESS NOTES
"Josué Pastor is a 58 y.o. year old male patient who is on U admission day 5.      Subjective   Josué Pastor is a 58 y.o. year old male patient who was personally seen and interviewed, and discussed in morning team rounds. The patient was interviewed at the end of the hallway (interviewed sitting in a chair), and was easily engaged and cooperative. This morning, Josué reports feeling \"really good\" and currently rates his depression at a 0 out of 10. He denies having suicidal ideation or suicide plans this morning. He also rates his anxiety at a 0 out of 10. No hallucinations or paranoia were endorsed or noted.   Josué slept 6 hours last night (unbroken).         Objective   Mental Status Exam:   General: Appropriately groomed and dressed in casual/hospital attire.   Appearance: Appears stated age.   Attitude: Calm, cooperative.   Behavior: Appropriate eye contact.   Motor Activity: No agitation or retardation. No EPS/TD. Normal gait and station. Normal muscle tone and bulk.   Speech: Regular rate, rhythm, volume and tone, spontaneous, fluent. Non-pressured.   Mood: \"Really good\"   Affect: Neutral.   Thought Process: Organized, and goal directed.   Thought Content: Does not currently endorse suicidal ideation or any suicide plans.  Does not endorse homicidal ideation.  No overt delusions or paranoia elicited.    Thought Perception: Does not endorse auditory or visual hallucinations, does not appear to be responding to hallucinatory stimuli.   Cognition: Alert, oriented x 3. No deficits noted. Adequate fund of knowledge. No deficit in recent and remote memory. No deficits in attention, concentration or language.   Insight: Poor-to-Fair, as patient recognizes symptoms of illness and need for recommended treatments.    Judgment: Poor, as patient can make not necessarily make reasonable decisions about ordinary activities of daily living and necessary medical care recommendations.         LABS:  Results " for orders placed or performed during the hospital encounter of 05/13/24 (from the past 96 hour(s))   POCT GLUCOSE   Result Value Ref Range    POCT Glucose 149 (H) 74 - 99 mg/dL   POCT GLUCOSE   Result Value Ref Range    POCT Glucose 192 (H) 74 - 99 mg/dL   POCT GLUCOSE   Result Value Ref Range    POCT Glucose 147 (H) 74 - 99 mg/dL   POCT GLUCOSE   Result Value Ref Range    POCT Glucose 173 (H) 74 - 99 mg/dL   POCT GLUCOSE   Result Value Ref Range    POCT Glucose 207 (H) 74 - 99 mg/dL   POCT GLUCOSE   Result Value Ref Range    POCT Glucose 208 (H) 74 - 99 mg/dL   POCT GLUCOSE   Result Value Ref Range    POCT Glucose 121 (H) 74 - 99 mg/dL   POCT GLUCOSE   Result Value Ref Range    POCT Glucose 166 (H) 74 - 99 mg/dL   POCT GLUCOSE   Result Value Ref Range    POCT Glucose 152 (H) 74 - 99 mg/dL   POCT GLUCOSE   Result Value Ref Range    POCT Glucose 165 (H) 74 - 99 mg/dL   POCT GLUCOSE   Result Value Ref Range    POCT Glucose 145 (H) 74 - 99 mg/dL   POCT GLUCOSE   Result Value Ref Range    POCT Glucose 155 (H) 74 - 99 mg/dL   POCT GLUCOSE   Result Value Ref Range    POCT Glucose 198 (H) 74 - 99 mg/dL   POCT GLUCOSE   Result Value Ref Range    POCT Glucose 168 (H) 74 - 99 mg/dL   POCT GLUCOSE   Result Value Ref Range    POCT Glucose 130 (H) 74 - 99 mg/dL        Last Recorded Vitals  Visit Vitals  /85 (BP Location: Right arm, Patient Position: Sitting)   Pulse 60   Temp 36.7 °C (98.1 °F)   Resp 16        Intake/Output last 3 Shifts:  No intake/output data recorded.    Relevant Results  Scheduled medications  aspirin, 81 mg, oral, Daily  atorvastatin, 80 mg, oral, Daily  gabapentin, 300 mg, oral, Daily  insulin glargine, 30 Units, subcutaneous, Nightly  metFORMIN XR, 1,000 mg, oral, BID  metoprolol succinate XL, 100 mg, oral, Daily  sertraline, 200 mg, oral, Daily  triamcinolone, , Topical, BID      Continuous medications     PRN medications  PRN medications: acetaminophen, alum-mag hydroxide-simeth, diphenhydrAMINE  **OR** diphenhydrAMINE, haloperidol **OR** haloperidol lactate, hydrOXYzine pamoate, LORazepam **OR** LORazepam, artificial tears, melatonin, psyllium               Assessment/Plan   1) Other Specified Depressive Disorder       Plan: *1) Zoloft to 100 ->150 -> 175 -> 200 mg Qdaily (continue)                2) Group and milieu therapy  Discussed potential risks, benefits, and alternatives to medications with patient, who consented to the above medications.     2) PTSD, chronic       Plan: 1) see above     3) Alcohol Use Disorder, moderate, dependence       Plan: 1) Refer to outpatient treatment program.      4) Diabetic Retinopathy        Plan: 1) IM service to follow and manage.     5) Coronary Artery Bypass Graft       Plan: 1) IM service to follow and manage.            Jason Louis MD

## 2024-05-19 LAB
GLUCOSE BLD MANUAL STRIP-MCNC: 117 MG/DL (ref 74–99)
GLUCOSE BLD MANUAL STRIP-MCNC: 138 MG/DL (ref 74–99)
GLUCOSE BLD MANUAL STRIP-MCNC: 189 MG/DL (ref 74–99)
GLUCOSE BLD MANUAL STRIP-MCNC: 208 MG/DL (ref 74–99)

## 2024-05-19 PROCEDURE — 1240000001 HC SEMI-PRIVATE BH ROOM DAILY

## 2024-05-19 PROCEDURE — 82947 ASSAY GLUCOSE BLOOD QUANT: CPT

## 2024-05-19 PROCEDURE — 2500000001 HC RX 250 WO HCPCS SELF ADMINISTERED DRUGS (ALT 637 FOR MEDICARE OP): Performed by: PSYCHIATRY & NEUROLOGY

## 2024-05-19 PROCEDURE — 99233 SBSQ HOSP IP/OBS HIGH 50: CPT | Performed by: PSYCHIATRY & NEUROLOGY

## 2024-05-19 PROCEDURE — 2500000006 HC RX 250 W HCPCS SELF ADMINISTERED DRUGS (ALT 637 FOR ALL PAYERS): Performed by: PSYCHIATRY & NEUROLOGY

## 2024-05-19 PROCEDURE — 2500000002 HC RX 250 W HCPCS SELF ADMINISTERED DRUGS (ALT 637 FOR MEDICARE OP, ALT 636 FOR OP/ED): Performed by: NURSE PRACTITIONER

## 2024-05-19 RX ADMIN — TRIAMCINOLONE ACETONIDE: 1 OINTMENT TOPICAL at 20:44

## 2024-05-19 RX ADMIN — INSULIN GLARGINE 30 UNITS: 100 INJECTION, SOLUTION SUBCUTANEOUS at 20:44

## 2024-05-19 RX ADMIN — TRIAMCINOLONE ACETONIDE: 1 OINTMENT TOPICAL at 08:21

## 2024-05-19 RX ADMIN — SERTRALINE HYDROCHLORIDE 200 MG: 100 TABLET ORAL at 08:21

## 2024-05-19 RX ADMIN — ASPIRIN 81 MG: 81 TABLET, COATED ORAL at 08:21

## 2024-05-19 RX ADMIN — METOPROLOL SUCCINATE 100 MG: 50 TABLET, EXTENDED RELEASE ORAL at 08:21

## 2024-05-19 RX ADMIN — ATORVASTATIN CALCIUM 80 MG: 80 TABLET, FILM COATED ORAL at 08:21

## 2024-05-19 RX ADMIN — METFORMIN HYDROCHLORIDE 1000 MG: 500 TABLET, EXTENDED RELEASE ORAL at 08:21

## 2024-05-19 RX ADMIN — GABAPENTIN 300 MG: 300 CAPSULE ORAL at 08:21

## 2024-05-19 RX ADMIN — METFORMIN HYDROCHLORIDE 1000 MG: 500 TABLET, EXTENDED RELEASE ORAL at 20:44

## 2024-05-19 ASSESSMENT — PAIN - FUNCTIONAL ASSESSMENT
PAIN_FUNCTIONAL_ASSESSMENT: 0-10
PAIN_FUNCTIONAL_ASSESSMENT: 0-10

## 2024-05-19 ASSESSMENT — PAIN SCALES - GENERAL
PAINLEVEL_OUTOF10: 0 - NO PAIN
PAINLEVEL_OUTOF10: 0 - NO PAIN

## 2024-05-19 NOTE — NURSING NOTE
"Patient sitting in dining room playing games with peers. A&Ox3 with VSS. Calm and cooperative during assessment. Patient denied anxiety, depression, SI/HI, VAT hallucinations and physical pain. Last BM 5/18. Coping skills consist of \"reading\" and patients goal for the shift is to \"finish my book\". Patient strengths are \"being kind and compassionate toward others\". Patient  at HS. Re educated patient on good food choices during evening snack time. Patient able to pick proper snacks. Requested movie night for the guys(peers on unit).   "

## 2024-05-19 NOTE — CARE PLAN
"The patient's goals for the shift include \"finish my book\"    The clinical goals for the shift include maintain safety    Over the shift, the patient did  make progress toward the following goals.   Problem: Sensory Perceptual Alteration as Evidenced by  Goal: Cooperates with admission process  Outcome: Progressing  Goal: Patient/Family participate in treatment and discharge plans  Outcome: Progressing  Goal: Patient/Family verbalizes awareness of resources  Outcome: Progressing  Goal: Participates in unit activities  Outcome: Progressing  Goal: Discusses signs/symptoms of illness/treatment options  Outcome: Progressing  Goal: Initiates reality-based interactions  Outcome: Progressing  Goal: Able to discuss content of hallucinations/delusions  Outcome: Progressing  Goal: Notifies staff when experiencing hallucinations/delusions  Outcome: Progressing  Goal: Verbalizes reduction in hallucinations/delusions  Outcome: Progressing  Goal: Will not act on psychotic perception  Outcome: Progressing  Goal: Understands least restrictive measures  Outcome: Progressing  Goal: Free from restraint events  Outcome: Progressing     Problem: Altered Thought Processes as Evidenced by  Goal: STG - Desires improvement in ability to think and concentrate  Outcome: Progressing  Goal: STG - Participates in Occupational Therapy and other cognitive assessments  Outcome: Progressing     Problem: Potential for Harm to Self or Others  Goal: Cooperates with admission process  Outcome: Progressing  Goal: Participates in unit activities  Outcome: Progressing  Goal: Patient/Family participate in treatment and discharge plans  Outcome: Progressing  Goal: Identifies deescalation techniques  Outcome: Progressing  Goal: Understands least restrictive measures  Outcome: Progressing  Goal: Identifies stressors that lead to harmful behaviors  Outcome: Progressing  Goal: Notifies staff when experiencing harmful thoughts toward self/others  Outcome: " Progressing  Goal: Denies harm toward self or others  Outcome: Progressing  Goal: Free from restraint events  Outcome: Progressing     Problem: Educational/Scholastic Disruption  Goal: Meets educational requirements during hospitalization  Outcome: Progressing  Goal: Attends class without disruptive behavior  Outcome: Progressing  Goal: Completes daily assignments  Outcome: Progressing     Problem: Ineffective Coping  Goal: Cooperates with admission process  Outcome: Progressing  Goal: Identifies ineffective coping skills  Outcome: Progressing  Goal: Identifies healthy coping skills  Outcome: Progressing  Goal: Demonstrates healthy coping skills  Outcome: Progressing  Goal: Participates in unit activities  Outcome: Progressing  Goal: Patient/Family participate in treatment and discharge plans  Outcome: Progressing  Goal: Patient/Family verbalizes awareness of resources  Outcome: Progressing  Goal: Understands least restrictive measures  Outcome: Progressing  Goal: Free from restraint events  Outcome: Progressing     Problem: Alteration in Sleep  Goal: STG - Reports nightly sleep, duration, and quality  Outcome: Progressing  Goal: STG - Identifies sleep hygiene aids  Outcome: Progressing  Goal: STG - Informs staff if unable to sleep  Outcome: Progressing  Goal: STG - Attends breathing and relaxation group  Outcome: Progressing     Problem: Potential for Substance Withdrawal  Goal: Verbalizes signs/symptoms of withdrawal  Outcome: Progressing  Goal: Reports signs/symptoms of withdrawal  Outcome: Progressing  Goal: Free of withdrawal symptoms  Outcome: Progressing     Problem: Anxiety  Goal: Patient/family understands admission protocols  Outcome: Progressing  Goal: Attempts to manage anxiety with help  Outcome: Progressing  Goal: Verbalizes ways to manage anxiety  Outcome: Progressing  Goal: Implements measures to reduce anxiety  Outcome: Progressing  Goal: Free from restraint events  Outcome: Progressing     Problem:  Self Care Deficit  Goal: STG - Patient completes hygiene  Outcome: Progressing  Goal: Increase group attendance  Outcome: Progressing  Goal: Accepts need for medications  Outcome: Progressing  Goal: STG - Goes to and eats meals independently in dining room 100% of time  Outcome: Progressing     Problem: Defensive Coping  Goal: Cooperates with admission process  Outcome: Progressing  Goal: Identifies reckless/dangerous behavior  Outcome: Progressing  Goal: Identifies stressors that lead to reckless/dangerous behavior  Outcome: Progressing  Goal: Discusses and identifies healthy coping skills  Outcome: Progressing  Goal: Demonstrates healthy coping skills  Outcome: Progressing  Goal: Identifies appropriate social interaction  Outcome: Progressing  Goal: Demonstrates appropriate social interactions  Outcome: Progressing  Goal: Patient/Family verbalizes awareness of resources  Outcome: Progressing  Goal: Discusses signs/symptoms of illness/treatment options  Outcome: Progressing  Goal: Patient/Family participate in treatment and discharge plans  Outcome: Progressing  Goal: Understands least restrictive measures  Outcome: Progressing  Goal: Free from restraint events  Outcome: Progressing     Problem: Pain  Goal: Takes deep breaths with improved pain control throughout the shift  Outcome: Progressing  Goal: Turns in bed with improved pain control throughout the shift  Outcome: Progressing  Goal: Walks with improved pain control throughout the shift  Outcome: Progressing  Goal: Performs ADL's with improved pain control throughout shift  Outcome: Progressing  Goal: Participates in PT with improved pain control throughout the shift  Outcome: Progressing  Goal: Free from opioid side effects throughout the shift  Outcome: Progressing  Goal: Free from acute confusion related to pain meds throughout the shift  Outcome: Progressing

## 2024-05-19 NOTE — GROUP NOTE
Group Topic: Art Creative   Group Date: 5/19/2024  Start Time: 1400  End Time: 1500  Facilitators: JEN Das   Department: [facility] REHAB THERAPY VIRTUAL    Number of Participants: 8   Group Focus: concentration, leisure skills, and social skills  Treatment Modality: Other: Recreational Therapy   Interventions utilized were exploration and leisure development  Purpose: other: creative expression, leisure awareness, social engagement     Name: Josué Pastor YOB: 1965   MR: [id number]      Facilitator: Recreational Therapist  Level of Participation: active  Quality of Participation: appropriate/pleasant, cooperative, and engaged  Interactions with others: appropriate  Mood/Affect: appropriate and positive  Triggers (if applicable): n/a  Cognition: coherent/clear  Progress: Moderate  Comments: Patients were gathered to participate in painting/ceramics. This activity works on creative expression, fine motor skills, following directions, positive social interaction, and leisure awareness.    Pt was fully engaged in art task, pleasant/social with peers, and sang along to music.     Plan: continue with services

## 2024-05-19 NOTE — CARE PLAN
Patient out on the unit and social with peers this shift. Denies anxiety and depression. Denied SI/HI. Denied auditory/visual/other hallucinations. No complaints of pain or discomfort. Medication compliant. Q 15 minute checks to be maintained throughout shift for safety. He has attended group and participates. He is med compliant.

## 2024-05-19 NOTE — GROUP NOTE
"Group Topic: Dialectical Behavioral Therapy - Mindfulness   Group Date: 5/19/2024  Start Time: 1600  End Time: 1640  Facilitators: JEN Das   Department: [facility] REHAB THERAPY VIRTUAL    Number of Participants: 8   Group Focus: mindfulness and relaxation  Treatment Modality: Other: Recreational Therapy   Interventions utilized were exploration, patient education, and support  Purpose: maladaptive thinking, insight or knowledge, and other: guided meditation     Name: Josué Pastor YOB: 1965   MR: [id number]      Facilitator: Recreational Therapist  Level of Participation: active  Quality of Participation: appropriate/pleasant, cooperative, and engaged  Interactions with others: appropriate  Mood/Affect: appropriate and positive  Triggers (if applicable): n/a  Cognition: coherent/clear  Progress: Moderate  Comments: Patients were provided with the \"What is Mindfulness?\" Handout. We discussed the definition of mindfulness, key elements/benefits of the skill, and a variety ways to practice mindfulness including (mindfulness meditation, mindfulness walk, body scan, and five senses). Following discussion, we listened to a 10 minute guided meditation as a group and patients were provided an opportunity to share their thoughts on the experience.    Pt was calm, cooperative, and engaged in discussion before and after guided meditation. He shared about the importance of \"taking time out of the day for yourself\". He sat calmly with eyes closed during meditation and stated, \"this was very relaxing, and it just goes to show you, you don't need much time.\"     Plan: continue with services      "

## 2024-05-19 NOTE — GROUP NOTE
Group Topic: Spiritual/Devotional/Thought of the Day   Group Date: 5/19/2024  Start Time: 0930  End Time: 1045  Facilitators: JEN Das   Department: [facility] REHAB THERAPY VIRTUAL    Number of Participants: 9   Group Focus: coping skills, goals, personal responsibility, and social skills  Treatment Modality: Other: Recreational Therapy   Interventions utilized were exploration, patient education, and support  Purpose: coping skills, maladaptive thinking, insight or knowledge, self-worth, and self-care    Name: Josué Pastor YOB: 1965   MR: [id number]      Facilitator: Recreational Therapist  Level of Participation: active  Quality of Participation: appropriate/pleasant, cooperative, and engaged  Interactions with others: appropriate, gave feedback, supportive, asked thoughtful questions, and offered helpful suggestions  Mood/Affect: appropriate and positive  Triggers (if applicable): n/a  Cognition: coherent/clear  Progress: Significant  Comments: Patients were provided with the Thought of the Day reading - “Taking care of the little things prevents larger problems.” Patients were given an opportunity to share their thoughts and encouraged to create a personal goal based on the reading.    Pt was pleasant, social, and very engaged in discussion. He showed good insight into personal recovery and struggles and offered advice/support to his peers.     Plan: continue with services

## 2024-05-19 NOTE — GROUP NOTE
"Group Topic: Leisure Skills   Group Date: 5/19/2024  Start Time: 1115  End Time: 1200  Facilitators: JEN Das   Department: [facility] REHAB THERAPY VIRTUAL    Number of Participants: 8   Group Focus: leisure skills, problem solving, and social skills  Treatment Modality: Other: Recreational Therapy   Interventions utilized were exploration, leisure development, and support  Purpose: other: leisure awareness, cognitive stimulation, social engagement, healthy competition     Name: Josué Pastor YOB: 1965   MR: [id number]      Facilitator: Recreational Therapist  Level of Participation: active  Quality of Participation: appropriate/pleasant, cooperative, and engaged  Interactions with others: appropriate  Mood/Affect: appropriate and positive  Triggers (if applicable): n/a  Cognition: coherent/clear  Progress: Moderate  Comments: Patients were gathered to participate in learn and participate in the game \"Food Lover's Trivia\". This activity works on cognitive skills, following directions, positive social interaction/teamwork, and promotes leisure awareness.    Pt was pleasant, social with peers - taking on leadership role on team. Complete all desired tasks.     Plan: continue with services      "

## 2024-05-20 LAB
GLUCOSE BLD MANUAL STRIP-MCNC: 146 MG/DL (ref 74–99)
GLUCOSE BLD MANUAL STRIP-MCNC: 146 MG/DL (ref 74–99)
GLUCOSE BLD MANUAL STRIP-MCNC: 195 MG/DL (ref 74–99)

## 2024-05-20 PROCEDURE — 97150 GROUP THERAPEUTIC PROCEDURES: CPT | Mod: GO,CO

## 2024-05-20 PROCEDURE — 2500000002 HC RX 250 W HCPCS SELF ADMINISTERED DRUGS (ALT 637 FOR MEDICARE OP, ALT 636 FOR OP/ED): Performed by: NURSE PRACTITIONER

## 2024-05-20 PROCEDURE — 99233 SBSQ HOSP IP/OBS HIGH 50: CPT | Performed by: PSYCHIATRY & NEUROLOGY

## 2024-05-20 PROCEDURE — 2500000005 HC RX 250 GENERAL PHARMACY W/O HCPCS: Performed by: NURSE PRACTITIONER

## 2024-05-20 PROCEDURE — 82947 ASSAY GLUCOSE BLOOD QUANT: CPT

## 2024-05-20 PROCEDURE — 2500000001 HC RX 250 WO HCPCS SELF ADMINISTERED DRUGS (ALT 637 FOR MEDICARE OP): Performed by: PSYCHIATRY & NEUROLOGY

## 2024-05-20 PROCEDURE — 2500000006 HC RX 250 W HCPCS SELF ADMINISTERED DRUGS (ALT 637 FOR ALL PAYERS): Performed by: PSYCHIATRY & NEUROLOGY

## 2024-05-20 PROCEDURE — 1240000001 HC SEMI-PRIVATE BH ROOM DAILY

## 2024-05-20 RX ADMIN — ASPIRIN 81 MG: 81 TABLET, COATED ORAL at 08:18

## 2024-05-20 RX ADMIN — TRIAMCINOLONE ACETONIDE: 1 OINTMENT TOPICAL at 20:37

## 2024-05-20 RX ADMIN — METFORMIN HYDROCHLORIDE 1000 MG: 500 TABLET, EXTENDED RELEASE ORAL at 08:18

## 2024-05-20 RX ADMIN — INSULIN GLARGINE 30 UNITS: 100 INJECTION, SOLUTION SUBCUTANEOUS at 20:45

## 2024-05-20 RX ADMIN — GABAPENTIN 300 MG: 300 CAPSULE ORAL at 08:19

## 2024-05-20 RX ADMIN — CARBOXYMETHYLCELLULOSE SODIUM 1 DROP: 5 SOLUTION/ DROPS OPHTHALMIC at 08:50

## 2024-05-20 RX ADMIN — ATORVASTATIN CALCIUM 80 MG: 80 TABLET, FILM COATED ORAL at 08:18

## 2024-05-20 RX ADMIN — METOPROLOL SUCCINATE 100 MG: 50 TABLET, EXTENDED RELEASE ORAL at 08:18

## 2024-05-20 RX ADMIN — METFORMIN HYDROCHLORIDE 1000 MG: 500 TABLET, EXTENDED RELEASE ORAL at 20:35

## 2024-05-20 RX ADMIN — SERTRALINE HYDROCHLORIDE 200 MG: 100 TABLET ORAL at 08:18

## 2024-05-20 RX ADMIN — TRIAMCINOLONE ACETONIDE: 1 OINTMENT TOPICAL at 08:18

## 2024-05-20 ASSESSMENT — PAIN SCALES - GENERAL
PAINLEVEL_OUTOF10: 0 - NO PAIN
PAINLEVEL_OUTOF10: 0 - NO PAIN

## 2024-05-20 NOTE — NURSING NOTE
"Upon assessment pt is is calm, cooperative, friendly and bright. Pt denied anxiety, depression, pain SI/HI and hallucinations. Strengths \"care about others, kind\", coping skills \"reading, riding motorcycle\" and goal \"watch movie later\". Pt is social with peers and medication compliant.  "

## 2024-05-20 NOTE — GROUP NOTE
"Group Topic: Leisure Skills   Group Date: 5/20/2024  Start Time: 1600  End Time: 1650  Facilitators: JEN Gusman   Department: [facility] REHAB THERAPY VIRTUAL    Number of Participants: 9   Group Focus: coping skills, leisure skills, and problem solving  Treatment Modality: Recreation Therapy  Interventions utilized were: Leisure Awareness Pictionary, Leisure Awareness Menu, leisure development, problem solving, and support  Purpose: Leisure Awareness, Social Stimulation, Cognitive Stimulation, Pleasurable Activity, coping skills    Name: Josué Pastor YOB: 1965   MR: [id number]      Facilitator: Recreational Therapist  Level of Participation: active  Quality of Participation: appropriate/pleasant and cooperative  Interactions with others: appropriate and supportive  Mood/Affect: appropriate and positive  Triggers (if applicable): N/A  Cognition: coherent/clear and logical  Progress: Moderate  Comments: This session involved a leisure activity called \"Leisure Pictionary\" and \"Leisure Awareness Menu\".  The activity involved cognitive tasks, social interactions, healthy competition, leisure awareness, and a pleasurable experience. All participants were encouraged to listen to the rules, ask questions as needed, and participate in the activity to the best of their abilities.    Patient completed all desired session tasks and attended the entire group.     Plan: continue with services      "

## 2024-05-20 NOTE — CARE PLAN
"The patient's goals for the shift include \"watch a movie\"    The clinical goals for the shift include medication compliance    Over the shift, the patient made progress towards care plan goals. Patient rested quietly through the night. No changes, no PRNs given.  "

## 2024-05-20 NOTE — CARE PLAN
"Discussed in Treatment team today;  still stabilizing; tentative plan to discharge on Wednesday, 05/22; Phoned wife, Shiloh, and she stated he is brighter and more future oriented but she is concerned he is not focussing on himself, but more others on the milieu: trying to help the other patients and be a cheerleader, and wonders what he will do at discharge without these peers establishing his sense of purpose;  Discussed transitioning to PHP/IOP at discharge and wife is all in favor of this.  Told wife of possible discharge this Wednesday the 22nd and she admitted she is still fearful to have him discharged, but is thinking of having his brothers \"check on him\" in shifts since she works 10 hour days.  Told her will check in with her soon to update again. Sw to follow.   "

## 2024-05-20 NOTE — NURSING NOTE
"Pt was up on unit social with peers , engaging with staff. Attending all groups. Pts wife brought in his freestyle Nidhi 2 in so he could check his blood sugar without getting his finger pricked so often . Pt stated \" could I please have my eye drops I did not get them yesterday\" Anxiety 1-2/10 depression 0/10. Pt denied SI/HI and A/V/H. Safety maintained. Med compliant. Pt contracted for safety with this staff at this time.   "

## 2024-05-20 NOTE — GROUP NOTE
Group Topic: Dialectical Behavioral Therapy - Emotional Regulation   Group Date: 5/20/2024  Start Time: 1400  End Time: 1500  Facilitators: JEN Gusman   Department: [facility] REHAB THERAPY VIRTUAL    Number of Participants: 8   Group Focus: coping skills, feeling awareness/expression, and personal responsibility  Treatment Modality: Recreation Therapy  Interventions utilized were: DBT-Emotion Regulation-Mindfulness or Current Emotions, Letting Go of Emotional Suffering, and Managing Extreme Emotions  Purpose: coping skills, feelings, and insight or knowledge    Name: Josué Pastor YOB: 1965   MR: [id number]      Facilitator: Recreational Therapist  Level of Participation: moderate  Quality of Participation: cooperative, quiet and tired  Interactions with others:  minimal, appropriate  Mood/Affect:  calm, tired  Triggers (if applicable): N/A  Cognition:  capable  Progress: Minimal  Comments: This group included education and discussion on how one can observe emotions, practice mindfulness of body sensations, remembering you aren’t your emotion, practicing loving one’s emotions, and how to manage extreme emotions (which included using coping techniques when reaching a skills breakdown point).    Patient attended the entire session, but displayed less participation than usual. He was visibly tired and had his eye shut during parts of the discussion.      Plan: continue with services

## 2024-05-20 NOTE — PROGRESS NOTES
"Josué Pastor is a 58 y.o. year old male patient who is on U admission day 7.      Subjective   Josué Pastor is a 58 y.o. year old male patient who was personally seen and interviewed, and discussed in morning team rounds. The patient was interviewed at the end of the hallway (interviewed sitting in a chair), and was easily engaged and cooperative. This morning, Josué reports feeling \"good\" (again) and currently rates his depression at a 0 out of 10. He denies having suicidal ideation or suicide plans this morning. He also rates his anxiety at a 2 out of 10. No hallucinations or paranoia were endorsed or noted.   Josué slept 8 hours last night (unbroken).         Objective   Mental Status Exam:   General: Appropriately groomed and dressed in casual/hospital attire.   Appearance: Appears stated age.   Attitude: Calm, cooperative.   Behavior: Appropriate eye contact.   Motor Activity: No agitation or retardation. No EPS/TD. Normal gait and station. Normal muscle tone and bulk.   Speech: Regular rate, rhythm, volume and tone, spontaneous, fluent. Non-pressured.   Mood: \"Good\" (again)   Affect: Neutral.   Thought Process: Organized, and goal directed.   Thought Content: Does not currently endorse suicidal ideation or any suicide plans.  Does not endorse homicidal ideation.  No overt delusions or paranoia elicited.    Thought Perception: Does not endorse auditory or visual hallucinations, does not appear to be responding to hallucinatory stimuli.   Cognition: Alert, oriented x 3. No deficits noted. Adequate fund of knowledge. No deficit in recent and remote memory. No deficits in attention, concentration or language.   Insight: Fair, as patient recognizes symptoms of illness and need for recommended treatments.    Judgment: Poor-to-Fair, as patient can make necessarily make reasonable decisions about ordinary activities of daily living and necessary medical care recommendations.         LABS:  Results for " orders placed or performed during the hospital encounter of 05/13/24 (from the past 96 hour(s))   POCT GLUCOSE   Result Value Ref Range    POCT Glucose 166 (H) 74 - 99 mg/dL   POCT GLUCOSE   Result Value Ref Range    POCT Glucose 152 (H) 74 - 99 mg/dL   POCT GLUCOSE   Result Value Ref Range    POCT Glucose 165 (H) 74 - 99 mg/dL   POCT GLUCOSE   Result Value Ref Range    POCT Glucose 145 (H) 74 - 99 mg/dL   POCT GLUCOSE   Result Value Ref Range    POCT Glucose 155 (H) 74 - 99 mg/dL   POCT GLUCOSE   Result Value Ref Range    POCT Glucose 198 (H) 74 - 99 mg/dL   POCT GLUCOSE   Result Value Ref Range    POCT Glucose 168 (H) 74 - 99 mg/dL   POCT GLUCOSE   Result Value Ref Range    POCT Glucose 130 (H) 74 - 99 mg/dL   POCT GLUCOSE   Result Value Ref Range    POCT Glucose 166 (H) 74 - 99 mg/dL   POCT GLUCOSE   Result Value Ref Range    POCT Glucose 180 (H) 74 - 99 mg/dL   POCT GLUCOSE   Result Value Ref Range    POCT Glucose 183 (H) 74 - 99 mg/dL   POCT GLUCOSE   Result Value Ref Range    POCT Glucose 117 (H) 74 - 99 mg/dL   POCT GLUCOSE   Result Value Ref Range    POCT Glucose 189 (H) 74 - 99 mg/dL   POCT GLUCOSE   Result Value Ref Range    POCT Glucose 138 (H) 74 - 99 mg/dL   POCT GLUCOSE   Result Value Ref Range    POCT Glucose 208 (H) 74 - 99 mg/dL   POCT GLUCOSE   Result Value Ref Range    POCT Glucose 146 (H) 74 - 99 mg/dL        Last Recorded Vitals  Visit Vitals  /50 (BP Location: Right arm, Patient Position: Sitting)   Pulse 64   Temp 36.4 °C (97.5 °F) (Temporal)   Resp 20        Intake/Output last 3 Shifts:  No intake/output data recorded.    Relevant Results  Scheduled medications  aspirin, 81 mg, oral, Daily  atorvastatin, 80 mg, oral, Daily  gabapentin, 300 mg, oral, Daily  insulin glargine, 30 Units, subcutaneous, Nightly  metFORMIN XR, 1,000 mg, oral, BID  metoprolol succinate XL, 100 mg, oral, Daily  sertraline, 200 mg, oral, Daily  triamcinolone, , Topical, BID      Continuous medications     PRN  medications  PRN medications: acetaminophen, alum-mag hydroxide-simeth, diphenhydrAMINE **OR** diphenhydrAMINE, haloperidol **OR** haloperidol lactate, hydrOXYzine pamoate, LORazepam **OR** LORazepam, artificial tears, melatonin, psyllium               Assessment/Plan   1) Other Specified Depressive Disorder       Plan: 1) Zoloft to 100 ->150 -> 175 -> 200 mg Qdaily (continue)                2) Group and milieu therapy  Discussed potential risks, benefits, and alternatives to medications with patient, who consented to the above medications.     2) PTSD, chronic       Plan: 1) see above     3) Alcohol Use Disorder, moderate, dependence       Plan: 1) Refer to outpatient treatment program.      4) Diabetic Retinopathy        Plan: 1) IM service to follow and manage.     5) Coronary Artery Bypass Graft       Plan: 1) IM service to follow and manage.            Jason Louis MD

## 2024-05-20 NOTE — GROUP NOTE
"Group Topic: Goals   Group Date: 5/20/2024  Start Time: 0730  End Time: 0800  Facilitators: JEN Gusman   Department: [facility] REHAB THERAPY VIRTUAL    Number of Participants: 8   Group Focus: check in, daily focus, and goals  Treatment Modality: Recreation Therapy  Interventions utilized were: Goal Setting Worksheet, exploration, orientation, and support  Purpose: Goal identification, insight or knowledge and self-care    Name: Josué Pastor YOB: 1965   MR: [id number]      Facilitator: Recreational Therapist  Level of Participation: active  Quality of Participation: appropriate/pleasant, cooperative, and engaged  Interactions with others: appropriate  Mood/Affect: appropriate and positive  Triggers (if applicable): N/A  Cognition: coherent/clear, goal directed, and logical  Progress: Moderate  Comments: The session focused on filling out and discussing a goal worksheet. The handout included a personal goal, target date to achieve the goal, action steps to take, identifying an outcome to the goal, and ways to stay motivated on accomplishing the personal goal.      Patient discussed the importance of \"keeping his momentum going\". He identified the progress that has been made and why he needs to continue on the path that he is on. Patient completed all desired session tasks and engaged well with his peers.     Plan: continue with services      "

## 2024-05-20 NOTE — PROGRESS NOTES
"Occupational Therapy     REHAB Therapy Assessment & Treatment    Patient Name: Josué Pastor  MRN: [id number]  Today's Date: 5/20/2024      Activity Assessment:    Roles/Responsibilities Group: [id number]  Self-Understanding and Self-Empowerment Group: [id number]  Leisure Skills/ Movement Group: [id number]    3/3 Groups attended      Pt present and engages in all groups with continued improved social interaction, attention to task, and insight. 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 Pt continues to make improvement toward OT goals as evidenced above. Pt would benefit from continued OT services in order to improve overall self-esteem, personal confidence and positive supports for safe transition at discharge.        Encounter Problems       Encounter Problems (Active)       OT Goals       Pt will explore and ID 1-2 strategies to manage stressors/symptoms of illness/ grief more effectively prior to discharge.  (Progressing)       Start:  05/14/24    Expected End:  06/04/24            Pt will ID/ utilize 1-2 ways to increase balance of activity/ re-involve self in functional daily routines/roles prior to discharge.  (Progressing)       Start:  05/14/24    Expected End:  06/04/24            Pt will explore/ ID 1-2 meaningful leisure activities to improve QOL for post discharge use.  (Progressing)       Start:  05/14/24    Expected End:  06/04/24            Pt will " ID 2-3 effective ways to distract from crisis and ID stressors/ personal symptoms of stress in order to improve management of stress during daily activities.  (Progressing)       Start:  05/14/24    Expected End:  06/04/24            Pt will ID 2 community resources/programs to join/attend after D/C to improve their support system (Progressing)       Start:  05/14/24    Expected End:  06/04/24                       Additional Comments:    HARRELL collaborated with patients nurse and charge nurse throughout the day to provide appropriate support and encouragement to attend groups. Pt up on unit when HARRELL left last group of the day. All needs met.

## 2024-05-20 NOTE — CARE PLAN
"The patient's goals for the shift include \"maintain momentum\"    The clinical goals for the shift include treatment compliant    Over the shift, the patient did not make progress toward the following goals. Barriers to progression include acknowledgement of illness. Recommendations to address these barriers include treatment plan.    "

## 2024-05-21 PROBLEM — F32.A DEPRESSION WITH SUICIDAL IDEATION: Status: RESOLVED | Noted: 2024-05-13 | Resolved: 2024-05-21

## 2024-05-21 PROBLEM — R45.851 DEPRESSION WITH SUICIDAL IDEATION: Status: RESOLVED | Noted: 2024-05-13 | Resolved: 2024-05-21

## 2024-05-21 PROCEDURE — 99233 SBSQ HOSP IP/OBS HIGH 50: CPT | Performed by: PSYCHIATRY & NEUROLOGY

## 2024-05-21 PROCEDURE — RXMED WILLOW AMBULATORY MEDICATION CHARGE

## 2024-05-21 PROCEDURE — 2500000001 HC RX 250 WO HCPCS SELF ADMINISTERED DRUGS (ALT 637 FOR MEDICARE OP): Performed by: PSYCHIATRY & NEUROLOGY

## 2024-05-21 PROCEDURE — 97150 GROUP THERAPEUTIC PROCEDURES: CPT | Mod: GO

## 2024-05-21 PROCEDURE — 2500000006 HC RX 250 W HCPCS SELF ADMINISTERED DRUGS (ALT 637 FOR ALL PAYERS): Performed by: PSYCHIATRY & NEUROLOGY

## 2024-05-21 PROCEDURE — 2500000005 HC RX 250 GENERAL PHARMACY W/O HCPCS: Performed by: NURSE PRACTITIONER

## 2024-05-21 PROCEDURE — 2500000002 HC RX 250 W HCPCS SELF ADMINISTERED DRUGS (ALT 637 FOR MEDICARE OP, ALT 636 FOR OP/ED): Performed by: NURSE PRACTITIONER

## 2024-05-21 PROCEDURE — 1240000001 HC SEMI-PRIVATE BH ROOM DAILY

## 2024-05-21 RX ORDER — SERTRALINE HYDROCHLORIDE 100 MG/1
200 TABLET, FILM COATED ORAL DAILY
Qty: 60 TABLET | Refills: 0 | Status: SHIPPED | OUTPATIENT
Start: 2024-05-22 | End: 2024-06-21

## 2024-05-21 RX ADMIN — ASPIRIN 81 MG: 81 TABLET, COATED ORAL at 09:00

## 2024-05-21 RX ADMIN — TRIAMCINOLONE ACETONIDE: 1 OINTMENT TOPICAL at 20:36

## 2024-05-21 RX ADMIN — METOPROLOL SUCCINATE 100 MG: 50 TABLET, EXTENDED RELEASE ORAL at 09:00

## 2024-05-21 RX ADMIN — SERTRALINE HYDROCHLORIDE 200 MG: 100 TABLET ORAL at 09:00

## 2024-05-21 RX ADMIN — METFORMIN HYDROCHLORIDE 1000 MG: 500 TABLET, EXTENDED RELEASE ORAL at 08:16

## 2024-05-21 RX ADMIN — GABAPENTIN 300 MG: 300 CAPSULE ORAL at 09:00

## 2024-05-21 RX ADMIN — CARBOXYMETHYLCELLULOSE SODIUM 1 DROP: 5 SOLUTION/ DROPS OPHTHALMIC at 08:16

## 2024-05-21 RX ADMIN — METFORMIN HYDROCHLORIDE 1000 MG: 500 TABLET, EXTENDED RELEASE ORAL at 20:36

## 2024-05-21 RX ADMIN — ATORVASTATIN CALCIUM 80 MG: 80 TABLET, FILM COATED ORAL at 09:00

## 2024-05-21 RX ADMIN — INSULIN GLARGINE 30 UNITS: 100 INJECTION, SOLUTION SUBCUTANEOUS at 20:33

## 2024-05-21 RX ADMIN — TRIAMCINOLONE ACETONIDE: 1 OINTMENT TOPICAL at 09:00

## 2024-05-21 ASSESSMENT — COLUMBIA-SUICIDE SEVERITY RATING SCALE - C-SSRS
6. HAVE YOU EVER DONE ANYTHING, STARTED TO DO ANYTHING, OR PREPARED TO DO ANYTHING TO END YOUR LIFE?: NO
2. HAVE YOU ACTUALLY HAD ANY THOUGHTS OF KILLING YOURSELF?: NO
1. SINCE LAST CONTACT, HAVE YOU WISHED YOU WERE DEAD OR WISHED YOU COULD GO TO SLEEP AND NOT WAKE UP?: NO

## 2024-05-21 ASSESSMENT — PAIN - FUNCTIONAL ASSESSMENT: PAIN_FUNCTIONAL_ASSESSMENT: 0-10

## 2024-05-21 ASSESSMENT — PAIN SCALES - GENERAL
PAINLEVEL_OUTOF10: 0 - NO PAIN
PAINLEVEL_OUTOF10: 0 - NO PAIN

## 2024-05-21 NOTE — GROUP NOTE
"Group Topic: Personal Responsibility   Group Date: 5/21/2024  Start Time: 1400  End Time: 1515  Facilitators: JEN Gusman   Department: [facility] REHAB THERAPY VIRTUAL    Number of Participants: 5   Group Focus: refusal skills, communication and loss/grief issues  Treatment Modality: Recreation Therapy  Interventions utilized were: Living Skills (\"I\" Statements and Refusal Skills), confrontation, story telling, and support  Purpose: coping skills, maladaptive thinking, feelings, communication skills, and insight or knowledge    Name: Josué Pastor YOB: 1965   MR: [id number]      Facilitator: Recreational Therapist  Level of Participation: active  Quality of Participation: attentive, cooperative, engaged, and initiates communication  Interactions with others: appropriate and supportive  Mood/Affect: appropriate and worried  Triggers (if applicable): N/A  Cognition: goal directed and logical  Progress: Moderate  Comments: Participants were encouraged to have dialogue, problem solve, and review information related to living skills and interpersonal communication.  This sessions handout included: refusal skills and practicing \"I\" statements.      Patient shared with the group that he is having some \"connection issues with his wife\". Patient identified his anxiety related to leaving soon and returning home. He discussed childhood issues including \"sleeping on the floor and in closets\" and \"experiencing nightmares\". Patient discussed his wife having \"trust issues\" because of his actions which led to his admission. Mr. Pastor was encouraged by staff and peers and also provided the space to share his thoughts/feelings. He was extremely supportive to his peers throughout the group.     Plan: continue with services      "

## 2024-05-21 NOTE — GROUP NOTE
"Group Topic: Goals   Group Date: 5/21/2024  Start Time: 0730  End Time: 0800  Facilitators: JEN Gusman   Department: [facility] REHAB THERAPY VIRTUAL    Number of Participants: 8   Group Focus: check in, daily focus, and goals  Treatment Modality: Recreation Therapy  Interventions utilized were: Recovery Concept Cards, Goals, orientation and support  Purpose: Recovery concept identification, goal identification, insight or knowledge    Name: Josué Pastor YOB: 1965   MR: [id number]      Facilitator: Recreational Therapist  Level of Participation: active  Quality of Participation: attentive, cooperative, engaged, and initiates communication  Interactions with others: appropriate  Mood/Affect: appropriate  Triggers (if applicable): N/A  Cognition: coherent/clear and logical  Progress: Moderate  Comments: The group utilized cards which included concepts of recovery. They were individually handed out to each participant.  Patients were asked to discuss, reflect on, and create a goal related to the ideas (examples: attitude, mood, coping skills, physical/mental health, safety, honesty, communication, etc.) the group shared.     Patient attended the entire session. He picked the card with \"negative thinking\" on it. Patient provided explanation of how negative thoughts can derail progress and a recovery. He provided examples and shared his thoughts about many of the concepts. Patient completed all desired session tasks.     Plan: continue with services      "

## 2024-05-21 NOTE — GROUP NOTE
"Group Topic: Leisure Skills   Group Date: 5/21/2024  Start Time: 1600  End Time: 1700  Facilitators: JEN Gusman   Department: [facility] REHAB THERAPY VIRTUAL    Number of Participants: 5   Group Focus: concentration, expression, leisure skills and reminiscence  Treatment Modality: Recreation Therapy  Interventions utilized were: Karaoke,  leisure development and story telling  Purpose: Leisure Awareness, cognitive stimulation, pleasurable activity, feelings    Name: Josué Pastor YOB: 1965   MR: [id number]      Facilitator: Recreational Therapist  Level of Participation: active  Quality of Participation: appropriate/pleasant, cooperative, engaged, and initiates communication  Interactions with others: appropriate  Mood/Affect: appropriate and positive  Triggers (if applicable): N/A  Cognition: coherent/clear and capable  Progress: Moderate  Comments: This session involved a leisure activity \"Karaoke\".  The activity involved cognitive tasks, social interactions, leisure awareness, and a pleasurable experience. All participants were encouraged to sing along with the lyrics or songs chosen by peers and participate in the activity to the best of their abilities.    Patient completed all desired session tasks and appeared to enjoy singing and spending time with his peers.     Plan: continue with services      "

## 2024-05-21 NOTE — CARE PLAN
"The patient's goals for the shift include \"talk to home, finish coloring\"    The clinical goals for the shift include treatment compliant    Over the shift, the patient did make progress toward the following goals. Barriers to progression include acknowledgement of illness. Recommendations to address these barriers include treatment plan.    "

## 2024-05-21 NOTE — CARE PLAN
"The patient's goals for the shift include \"talk to home, finish coloring\"    The clinical goals for the shift include treatment compliant    Over the shift, the patient did  make progress toward the following goals.  Problem: Sensory Perceptual Alteration as Evidenced by  Goal: Cooperates with admission process  5/20/2024 2139 by Zaida Shelby RN  Outcome: Progressing  5/20/2024 2139 by Zaida Shelby RN  Outcome: Progressing  Goal: Patient/Family participate in treatment and discharge plans  5/20/2024 2139 by Zaida Shelby RN  Outcome: Progressing  5/20/2024 2139 by Zaida Shelby RN  Outcome: Progressing  Goal: Patient/Family verbalizes awareness of resources  5/20/2024 2139 by Zaida Shelby RN  Outcome: Progressing  5/20/2024 2139 by Zaida Shelby RN  Outcome: Progressing  Goal: Participates in unit activities  5/20/2024 2139 by Zaida Shelby RN  Outcome: Progressing  5/20/2024 2139 by Zaida Shelby RN  Outcome: Progressing  Goal: Discusses signs/symptoms of illness/treatment options  5/20/2024 2139 by Zaida Shelby RN  Outcome: Progressing  5/20/2024 2139 by Zaida Shelby RN  Outcome: Progressing  Goal: Initiates reality-based interactions  5/20/2024 2139 by Zaida Shelby RN  Outcome: Progressing  5/20/2024 2139 by Zaida Shelby RN  Outcome: Progressing  Goal: Able to discuss content of hallucinations/delusions  5/20/2024 2139 by Zaida Shelby RN  Outcome: Progressing  5/20/2024 2139 by Zaida Shelby RN  Outcome: Progressing  Goal: Notifies staff when experiencing hallucinations/delusions  5/20/2024 2139 by Zaida Shelby RN  Outcome: Progressing  5/20/2024 2139 by Zaida Shelby RN  Outcome: Progressing  Goal: Verbalizes reduction in hallucinations/delusions  5/20/2024 2139 by Zaida Shelby RN  Outcome: Progressing  5/20/2024 2139 by Zaida" JU Shelby  Outcome: Progressing  Goal: Will not act on psychotic perception  5/20/2024 2139 by Zaida Shelby RN  Outcome: Progressing  5/20/2024 2139 by Zaida Shelby RN  Outcome: Progressing  Goal: Understands least restrictive measures  5/20/2024 2139 by Zaida Shelby RN  Outcome: Progressing  5/20/2024 2139 by Zaida Shelby RN  Outcome: Progressing  Goal: Free from restraint events  5/20/2024 2139 by Zaida Shelby RN  Outcome: Progressing  5/20/2024 2139 by Zaida Shelby RN  Outcome: Progressing     Problem: Altered Thought Processes as Evidenced by  Goal: STG - Desires improvement in ability to think and concentrate  5/20/2024 2139 by Zaida Shelby RN  Outcome: Progressing  5/20/2024 2139 by Zaida Shelby RN  Outcome: Progressing  Goal: STG - Participates in Occupational Therapy and other cognitive assessments  5/20/2024 2139 by Zaida Shelby RN  Outcome: Progressing  5/20/2024 2139 by Zaida Shelby RN  Outcome: Progressing     Problem: Potential for Harm to Self or Others  Goal: Cooperates with admission process  5/20/2024 2139 by Zaida Shelby RN  Outcome: Progressing  5/20/2024 2139 by Zaida Shelby RN  Outcome: Progressing  Goal: Participates in unit activities  5/20/2024 2139 by Zaida Shelby RN  Outcome: Progressing  5/20/2024 2139 by Zaida Shelby RN  Outcome: Progressing  Goal: Patient/Family participate in treatment and discharge plans  5/20/2024 2139 by Zaida Shelby RN  Outcome: Progressing  5/20/2024 2139 by Zaida Shelby RN  Outcome: Progressing  Goal: Identifies deescalation techniques  5/20/2024 2139 by Zaida Shelby RN  Outcome: Progressing  5/20/2024 2139 by Zaida Shelby RN  Outcome: Progressing  Goal: Understands least restrictive measures  5/20/2024 2139 by Zaida Shelby RN  Outcome:  Progressing  5/20/2024 2139 by Zaida Shelby RN  Outcome: Progressing  Goal: Identifies stressors that lead to harmful behaviors  5/20/2024 2139 by Zaida Shelby RN  Outcome: Progressing  5/20/2024 2139 by Zaida Shelby RN  Outcome: Progressing  Goal: Notifies staff when experiencing harmful thoughts toward self/others  5/20/2024 2139 by Zaida Shelby RN  Outcome: Progressing  5/20/2024 2139 by Zaida Shelby RN  Outcome: Progressing  Goal: Denies harm toward self or others  5/20/2024 2139 by Zaida Shelby RN  Outcome: Progressing  5/20/2024 2139 by Zaida Shelby RN  Outcome: Progressing  Goal: Free from restraint events  5/20/2024 2139 by Zaida Shelby RN  Outcome: Progressing  5/20/2024 2139 by Zaida Shelby RN  Outcome: Progressing     Problem: Educational/Scholastic Disruption  Goal: Meets educational requirements during hospitalization  5/20/2024 2139 by Zaida Shelby RN  Outcome: Progressing  5/20/2024 2139 by Zaida Shelby RN  Outcome: Progressing  Goal: Attends class without disruptive behavior  5/20/2024 2139 by Zaida Shelby RN  Outcome: Progressing  5/20/2024 2139 by Zaida Shelby RN  Outcome: Progressing  Goal: Completes daily assignments  5/20/2024 2139 by Zaida Shelby RN  Outcome: Progressing  5/20/2024 2139 by Zaida Shelby RN  Outcome: Progressing     Problem: Ineffective Coping  Goal: Cooperates with admission process  5/20/2024 2139 by Zaida Shelby RN  Outcome: Progressing  5/20/2024 2139 by Zaida Shelby RN  Outcome: Progressing  Goal: Identifies ineffective coping skills  5/20/2024 2139 by Zaida Shelby RN  Outcome: Progressing  5/20/2024 2139 by Zaida Shelby RN  Outcome: Progressing  Goal: Identifies healthy coping skills  5/20/2024 2139 by Zaida Shelby RN  Outcome: Progressing  5/20/2024 2139 by  Zaida Shelby RN  Outcome: Progressing  Goal: Demonstrates healthy coping skills  5/20/2024 2139 by Zaida Shelby RN  Outcome: Progressing  5/20/2024 2139 by Zaida Shelby RN  Outcome: Progressing  Goal: Participates in unit activities  5/20/2024 2139 by Zaida Shelby RN  Outcome: Progressing  5/20/2024 2139 by Zaida Shelby RN  Outcome: Progressing  Goal: Patient/Family participate in treatment and discharge plans  5/20/2024 2139 by Zaida Shelby RN  Outcome: Progressing  5/20/2024 2139 by Zaida Shelby RN  Outcome: Progressing  Goal: Patient/Family verbalizes awareness of resources  5/20/2024 2139 by Zaida Shelby RN  Outcome: Progressing  5/20/2024 2139 by Zaida Shelby RN  Outcome: Progressing  Goal: Understands least restrictive measures  5/20/2024 2139 by Zaida Shelby RN  Outcome: Progressing  5/20/2024 2139 by Zaida Shelby RN  Outcome: Progressing  Goal: Free from restraint events  5/20/2024 2139 by Zaida Shelby RN  Outcome: Progressing  5/20/2024 2139 by Zaida Shelby RN  Outcome: Progressing     Problem: Alteration in Sleep  Goal: STG - Reports nightly sleep, duration, and quality  5/20/2024 2139 by Zaida Shelby RN  Outcome: Progressing  5/20/2024 2139 by Zaida Shelby RN  Outcome: Progressing  Goal: STG - Identifies sleep hygiene aids  5/20/2024 2139 by Zaida Shelby RN  Outcome: Progressing  5/20/2024 2139 by Zaida Shelby RN  Outcome: Progressing  Goal: STG - Informs staff if unable to sleep  5/20/2024 2139 by Zaida Shelby RN  Outcome: Progressing  5/20/2024 2139 by Zaida Shelby RN  Outcome: Progressing  Goal: STG - Attends breathing and relaxation group  5/20/2024 2139 by Zaida Shelby RN  Outcome: Progressing  5/20/2024 2139 by Zaida Shelby RN  Outcome: Progressing     Problem: Potential  for Substance Withdrawal  Goal: Verbalizes signs/symptoms of withdrawal  5/20/2024 2139 by Zaida Shelby RN  Outcome: Progressing  5/20/2024 2139 by Zaida Shelby RN  Outcome: Progressing  Goal: Reports signs/symptoms of withdrawal  5/20/2024 2139 by Zaida Shelby RN  Outcome: Progressing  5/20/2024 2139 by Zaida Shelby RN  Outcome: Progressing  Goal: Free of withdrawal symptoms  5/20/2024 2139 by Zaida Shelby RN  Outcome: Progressing  5/20/2024 2139 by Zaida Shelby RN  Outcome: Progressing     Problem: Anxiety  Goal: Patient/family understands admission protocols  5/20/2024 2139 by Zaida Shelby RN  Outcome: Progressing  5/20/2024 2139 by Zaida Shelby RN  Outcome: Progressing  Goal: Attempts to manage anxiety with help  5/20/2024 2139 by Zaida Shelby RN  Outcome: Progressing  5/20/2024 2139 by Zaida Shelby RN  Outcome: Progressing  Goal: Verbalizes ways to manage anxiety  5/20/2024 2139 by Zaida Shelby RN  Outcome: Progressing  5/20/2024 2139 by Zaida Shelby RN  Outcome: Progressing  Goal: Implements measures to reduce anxiety  5/20/2024 2139 by Zaida Shelby RN  Outcome: Progressing  5/20/2024 2139 by Zaida Shelby RN  Outcome: Progressing  Goal: Free from restraint events  5/20/2024 2139 by Zaida Shelby RN  Outcome: Progressing  5/20/2024 2139 by Zaida Shelby RN  Outcome: Progressing     Problem: Self Care Deficit  Goal: STG - Patient completes hygiene  5/20/2024 2139 by Zaida Shelby RN  Outcome: Progressing  5/20/2024 2139 by Zaida Shelby RN  Outcome: Progressing  Goal: Increase group attendance  5/20/2024 2139 by Zaida Shelby RN  Outcome: Progressing  5/20/2024 2139 by Zaida Shelby RN  Outcome: Progressing  Goal: Accepts need for medications  5/20/2024 2139 by Zaida Shelby RN  Outcome:  Progressing  5/20/2024 2139 by Zaida Shelby RN  Outcome: Progressing  Goal: STG - Goes to and eats meals independently in dining room 100% of time  5/20/2024 2139 by Zaida Shelby RN  Outcome: Progressing  5/20/2024 2139 by Zaida Shelby RN  Outcome: Progressing     Problem: Defensive Coping  Goal: Cooperates with admission process  5/20/2024 2139 by Zaida Shelby RN  Outcome: Progressing  5/20/2024 2139 by Zaida Shelby RN  Outcome: Progressing  Goal: Identifies reckless/dangerous behavior  5/20/2024 2139 by Zaida Shelby RN  Outcome: Progressing  5/20/2024 2139 by Zaida Shelby RN  Outcome: Progressing  Goal: Identifies stressors that lead to reckless/dangerous behavior  5/20/2024 2139 by Zaida Shelby RN  Outcome: Progressing  5/20/2024 2139 by Zaida Shelby RN  Outcome: Progressing  Goal: Discusses and identifies healthy coping skills  5/20/2024 2139 by Zaida Shelby RN  Outcome: Progressing  5/20/2024 2139 by Zaida Shelby RN  Outcome: Progressing  Goal: Demonstrates healthy coping skills  5/20/2024 2139 by Zaida Shelby RN  Outcome: Progressing  5/20/2024 2139 by Zaida Shelby RN  Outcome: Progressing  Goal: Identifies appropriate social interaction  5/20/2024 2139 by Zaida Shelby RN  Outcome: Progressing  5/20/2024 2139 by Zaida Shelby RN  Outcome: Progressing  Goal: Demonstrates appropriate social interactions  5/20/2024 2139 by Zaida Shelby RN  Outcome: Progressing  5/20/2024 2139 by Zaida Shelby RN  Outcome: Progressing  Goal: Patient/Family verbalizes awareness of resources  5/20/2024 2139 by Zaida Shelby RN  Outcome: Progressing  5/20/2024 2139 by Zaida Shelby RN  Outcome: Progressing  Goal: Discusses signs/symptoms of illness/treatment options  5/20/2024 2139 by Zaida Shelby RN  Outcome:  Progressing  5/20/2024 2139 by Zaida Shelby RN  Outcome: Progressing  Goal: Patient/Family participate in treatment and discharge plans  5/20/2024 2139 by Zaida Shelby RN  Outcome: Progressing  5/20/2024 2139 by Zaida Shelby RN  Outcome: Progressing  Goal: Understands least restrictive measures  5/20/2024 2139 by Zaida Shelby RN  Outcome: Progressing  5/20/2024 2139 by Zaida Shelby RN  Outcome: Progressing  Goal: Free from restraint events  5/20/2024 2139 by Zaida Shelby RN  Outcome: Progressing  5/20/2024 2139 by Zaida Shelby RN  Outcome: Progressing     Problem: Pain  Goal: Takes deep breaths with improved pain control throughout the shift  5/20/2024 2139 by Zaida Shelby RN  Outcome: Progressing  5/20/2024 2139 by Zaida Shelby RN  Outcome: Progressing  Goal: Turns in bed with improved pain control throughout the shift  5/20/2024 2139 by Zaida Shelby RN  Outcome: Progressing  5/20/2024 2139 by Zaida Shelby RN  Outcome: Progressing  Goal: Walks with improved pain control throughout the shift  5/20/2024 2139 by Zaida Shelby RN  Outcome: Progressing  5/20/2024 2139 by Zaida Shelby RN  Outcome: Progressing  Goal: Performs ADL's with improved pain control throughout shift  5/20/2024 2139 by Zaida Shelby RN  Outcome: Progressing  5/20/2024 2139 by Zaida Shelby RN  Outcome: Progressing  Goal: Participates in PT with improved pain control throughout the shift  5/20/2024 2139 by Zaida Shelby RN  Outcome: Progressing  5/20/2024 2139 by Zaida Shelby RN  Outcome: Progressing  Goal: Free from opioid side effects throughout the shift  5/20/2024 2139 by Zaida Shelby RN  Outcome: Progressing  5/20/2024 2139 by Zaida Shelby RN  Outcome: Progressing  Goal: Free from acute confusion related to pain meds throughout the  shift  5/20/2024 2139 by Zaida Shelby, RN  Outcome: Progressing  5/20/2024 2139 by Zaida Shelby, RN  Outcome: Progressing

## 2024-05-21 NOTE — DISCHARGE INSTR - APPOINTMENTS
Walk In Assessment at:  [facility], for Dual Diagnosis PHP/IOP Services (Mental Health & Addiction)  Go on Thursday, May 23rd 2024 at 9:00 AM  (They have 24/7 walk in Assessments)  At  [facility],  [address], [place] [address].    P. [phone];  F. [phone];    2.  Intake Assessment for Individual Therapy and Psychiatry:  On: Date: Tuesday, May 28th, 2024 at 10:00 AM.  At: [facility],  [address] [place] [id number].    P. [phone];  F. [phone];    (You will receive an email new patient packet from [facility] to complete online and send in before the appointment with [facility])  Note: [facility] also offers Dual IOP/PHP, as another option to [facility].

## 2024-05-21 NOTE — NURSING NOTE
"      Patient laying bed, sleeping, upon nursing assessment. A&Ox3 with VSS. Calm and cooperative with minimal eye contact due to PMH of diabetes complications ( blindness in left eye). Reported anxiety 2/10 \"thinking about how I got here\". Denies depression, SI/HI, VAT hallucinations and physical pain. Last BM 5/20. Coping skills consist of \"reading\" and shift goal is to \"watch a movie with the guys leaving tomorrow\". Patient strengths consist of being\"kind and generous\". Wife brought in monitor for Gillian device. Gillian replaced, new location right triceps. BS @ . Scheduled lantis given per MD order. Pending discharge 5/22 too home, patient aware.   "

## 2024-05-21 NOTE — PROGRESS NOTES
"Josué Pastor is a 58 y.o. year old male patient who is on U admission day 8.      Subjective   Josué Pastor is a 58 y.o. year old male patient who was personally seen and interviewed, and discussed in morning team rounds. The patient was interviewed at the end of the hallway (interviewed sitting in a chair), and was easily engaged and cooperative. This morning, Josué reports feeling \"outstanding\" and currently rates his depression at a 0 out of 10. He denies having suicidal ideation or suicide plans. He also rates his anxiety at a 1 out of 10. No hallucinations or paranoia were endorsed or noted.   Josué slept 5 hours last night (unbroken).         Objective   Mental Status Exam:   General: Appropriately groomed and dressed in casual/hospital attire.   Appearance: Appears stated age.   Attitude: Calm, cooperative.   Behavior: Appropriate eye contact.   Motor Activity: No agitation or retardation. No EPS/TD. Normal gait and station. Normal muscle tone and bulk.   Speech: Regular rate, rhythm, volume and tone, spontaneous, fluent. Non-pressured.   Mood: \"Outstanding\"   Affect: Neutral.   Thought Process: Organized, and goal directed.   Thought Content: Does not currently endorse suicidal ideation or any suicide plans.  Does not endorse homicidal ideation.  No overt delusions or paranoia elicited.    Thought Perception: Does not endorse auditory or visual hallucinations, does not appear to be responding to hallucinatory stimuli.   Cognition: Alert, oriented x 3. No deficits noted. Adequate fund of knowledge. No deficit in recent and remote memory. No deficits in attention, concentration or language.   Insight: Fair, as patient recognizes symptoms of illness and need for recommended treatments.    Judgment: Fair, as patient can make necessarily make reasonable decisions about ordinary activities of daily living and necessary medical care recommendations.         LABS:  Results for orders placed or " performed during the hospital encounter of 05/13/24 (from the past 96 hour(s))   POCT GLUCOSE   Result Value Ref Range    POCT Glucose 198 (H) 74 - 99 mg/dL   POCT GLUCOSE   Result Value Ref Range    POCT Glucose 168 (H) 74 - 99 mg/dL   POCT GLUCOSE   Result Value Ref Range    POCT Glucose 130 (H) 74 - 99 mg/dL   POCT GLUCOSE   Result Value Ref Range    POCT Glucose 166 (H) 74 - 99 mg/dL   POCT GLUCOSE   Result Value Ref Range    POCT Glucose 180 (H) 74 - 99 mg/dL   POCT GLUCOSE   Result Value Ref Range    POCT Glucose 183 (H) 74 - 99 mg/dL   POCT GLUCOSE   Result Value Ref Range    POCT Glucose 117 (H) 74 - 99 mg/dL   POCT GLUCOSE   Result Value Ref Range    POCT Glucose 189 (H) 74 - 99 mg/dL   POCT GLUCOSE   Result Value Ref Range    POCT Glucose 138 (H) 74 - 99 mg/dL   POCT GLUCOSE   Result Value Ref Range    POCT Glucose 208 (H) 74 - 99 mg/dL   POCT GLUCOSE   Result Value Ref Range    POCT Glucose 146 (H) 74 - 99 mg/dL   POCT GLUCOSE   Result Value Ref Range    POCT Glucose 146 (H) 74 - 99 mg/dL   POCT GLUCOSE   Result Value Ref Range    POCT Glucose 195 (H) 74 - 99 mg/dL        Last Recorded Vitals  Visit Vitals  /64 (BP Location: Right arm, Patient Position: Sitting)   Pulse 57   Temp 36.8 °C (98.2 °F) (Temporal)   Resp 20        Intake/Output last 3 Shifts:  No intake/output data recorded.    Relevant Results  Scheduled medications  aspirin, 81 mg, oral, Daily  atorvastatin, 80 mg, oral, Daily  gabapentin, 300 mg, oral, Daily  insulin glargine, 30 Units, subcutaneous, Nightly  metFORMIN XR, 1,000 mg, oral, BID  metoprolol succinate XL, 100 mg, oral, Daily  sertraline, 200 mg, oral, Daily  triamcinolone, , Topical, BID      Continuous medications     PRN medications  PRN medications: acetaminophen, alum-mag hydroxide-simeth, diphenhydrAMINE **OR** diphenhydrAMINE, haloperidol **OR** haloperidol lactate, hydrOXYzine pamoate, LORazepam **OR** LORazepam, artificial tears, melatonin, psyllium                Assessment/Plan   1) Other Specified Depressive Disorder       Plan: 1) Zoloft to 100 ->150 -> 175 -> 200 mg Qdaily (continue)                2) Group and milieu therapy  Discussed potential risks, benefits, and alternatives to medications with patient, who consented to the above medications.     2) PTSD, chronic       Plan: 1) see above     3) Alcohol Use Disorder, moderate, dependence       Plan: 1) Refer to outpatient treatment program.      4) Diabetic Retinopathy        Plan: 1) IM service to follow and manage.     5) Coronary Artery Bypass Graft       Plan: 1) IM service to follow and manage.            Jason Louis MD

## 2024-05-21 NOTE — PROGRESS NOTES
"Occupational Therapy     REHAB Therapy Assessment & Treatment    Patient Name: Josué Pastor  MRN: [id number]  Today's Date: 5/21/2024    Attendance:  Attendance  Participation: Active participation    Therapeutic Recreation:  Treatment Approach  Approach : Group therapy sessions    Activity Assessment:  Positive Prompts Group: [id number]  Self-Awareness and Releasing Stressors Group: [id number]    2/2 groups attended    Pt an active participate in both groups with no prompting necessary. Pt demonstrated good understanding of OT interventions and was able to appropriately provide several positive answers to prompts during beach ball activity. Pt able to verbalize multiple instances of stressors \"to let go of\" without any verbal cues or assistance. Additionally pt with very appropriate social behavior and demonstrated positive emotions towards peers. Overall pt making good progress towards OT goals and would benefit from continued participation in interventions.    Encounter Problems       Encounter Problems (Active)       OT Goals       Pt will explore and ID 1-2 strategies to manage stressors/symptoms of illness/ grief more effectively prior to discharge.  (Progressing)       Start:  05/14/24    Expected End:  06/04/24            Pt will ID/ utilize 1-2 ways to increase balance of activity/ re-involve self in functional daily routines/roles prior to discharge.  (Progressing)       Start:  05/14/24    Expected End:  06/04/24            Pt will explore/ ID 1-2 meaningful leisure activities to improve QOL for post discharge use.  (Progressing)       Start:  05/14/24    Expected End:  06/04/24            Pt will ID 2-3 effective ways to distract from crisis and ID stressors/ personal symptoms of stress in order to improve management of stress during daily activities.  (Progressing)       Start:  05/14/24    Expected End:  06/04/24            Pt will ID 2 community resources/programs to join/attend after D/C to " improve their support system (Progressing)       Start:  05/14/24    Expected End:  06/04/24                     Education Documentation  No documentation found.  Education Comments  No comments found.          Additional Comments:

## 2024-05-21 NOTE — NURSING NOTE
"Pt has been up unit social with peers and engaging with staff. Pt is very involved with other pts progress. We spoke today and talked about what he is going to do when he gets home . Pt stated \" I am nervous about leaving and how it is going to be\" Anxiety 2-3/10 depression 0/10.  Pt has a free style so we are tracking his Blood sugars morning   lunch  dinner  Pt denied SI/HI and A/V/H. Safety maintained. Med compliant. Pt contracted for safety with this staff at this time.   "

## 2024-05-21 NOTE — GROUP NOTE
Group Topic: Gross Motor/Balance Skills   Group Date: 5/21/2024  Start Time: 1100  End Time: 1200  Facilitators: JEN Gusman   Department: [facility] REHAB THERAPY VIRTUAL    Number of Participants: 7   Group Focus: Movement, Leisure Awareness, leisure skills  Treatment Modality: Recreation Therapy  Interventions utilized were: [place], Music, leisure development  Purpose: Leisure Awareness, Physical/Movement, Pleasurable Activity, Social Stimulation    Name: Josué Pastor YOB: 1965   MR: [id number]      Facilitator: Recreational Therapist  Level of Participation: active  Quality of Participation: appropriate/pleasant, cooperative, and engaged  Interactions with others: appropriate  Mood/Affect: appropriate  Triggers (if applicable): N/A  Cognition: coherent/clear and logical  Progress: Moderate  Comments: This physical activity involved coordinating movements, social interactions, healthy competition, leisure awareness, and a pleasurable experience.     Patient attended the entire session and completed al group tasks independently.     Plan: continue with services

## 2024-05-22 ENCOUNTER — PHARMACY VISIT (OUTPATIENT)
Dept: PHARMACY | Facility: CLINIC | Age: 59
End: 2024-05-22
Payer: COMMERCIAL

## 2024-05-22 VITALS
DIASTOLIC BLOOD PRESSURE: 80 MMHG | RESPIRATION RATE: 20 BRPM | TEMPERATURE: 96.1 F | WEIGHT: 214.95 LBS | HEART RATE: 66 BPM | BODY MASS INDEX: 30.77 KG/M2 | HEIGHT: 70 IN | OXYGEN SATURATION: 97 % | SYSTOLIC BLOOD PRESSURE: 154 MMHG

## 2024-05-22 DIAGNOSIS — E11.69 TYPE 2 DIABETES MELLITUS WITH OTHER SPECIFIED COMPLICATION, WITHOUT LONG-TERM CURRENT USE OF INSULIN (MULTI): Primary | ICD-10-CM

## 2024-05-22 PROCEDURE — 2500000006 HC RX 250 W HCPCS SELF ADMINISTERED DRUGS (ALT 637 FOR ALL PAYERS): Performed by: PSYCHIATRY & NEUROLOGY

## 2024-05-22 PROCEDURE — 99239 HOSP IP/OBS DSCHRG MGMT >30: CPT | Performed by: PSYCHIATRY & NEUROLOGY

## 2024-05-22 PROCEDURE — RXMED WILLOW AMBULATORY MEDICATION CHARGE

## 2024-05-22 PROCEDURE — 97150 GROUP THERAPEUTIC PROCEDURES: CPT | Mod: GO

## 2024-05-22 PROCEDURE — 2500000001 HC RX 250 WO HCPCS SELF ADMINISTERED DRUGS (ALT 637 FOR MEDICARE OP): Performed by: PSYCHIATRY & NEUROLOGY

## 2024-05-22 RX ORDER — INSULIN GLARGINE 100 [IU]/ML
30 INJECTION, SOLUTION SUBCUTANEOUS NIGHTLY
Qty: 15 ML | Refills: 0 | Status: SHIPPED | OUTPATIENT
Start: 2024-05-22

## 2024-05-22 RX ORDER — DULAGLUTIDE 3 MG/.5ML
1 INJECTION, SOLUTION SUBCUTANEOUS
Qty: 2 ML | Refills: 5 | Status: SHIPPED | OUTPATIENT
Start: 2024-05-22 | End: 2025-05-22

## 2024-05-22 RX ADMIN — GABAPENTIN 300 MG: 300 CAPSULE ORAL at 08:22

## 2024-05-22 RX ADMIN — ATORVASTATIN CALCIUM 80 MG: 80 TABLET, FILM COATED ORAL at 08:22

## 2024-05-22 RX ADMIN — METFORMIN HYDROCHLORIDE 1000 MG: 500 TABLET, EXTENDED RELEASE ORAL at 08:22

## 2024-05-22 RX ADMIN — TRIAMCINOLONE ACETONIDE: 1 OINTMENT TOPICAL at 08:20

## 2024-05-22 RX ADMIN — METOPROLOL SUCCINATE 100 MG: 50 TABLET, EXTENDED RELEASE ORAL at 08:22

## 2024-05-22 RX ADMIN — SERTRALINE HYDROCHLORIDE 200 MG: 100 TABLET ORAL at 08:22

## 2024-05-22 RX ADMIN — ASPIRIN 81 MG: 81 TABLET, COATED ORAL at 08:22

## 2024-05-22 ASSESSMENT — COLUMBIA-SUICIDE SEVERITY RATING SCALE - C-SSRS
6. HAVE YOU EVER DONE ANYTHING, STARTED TO DO ANYTHING, OR PREPARED TO DO ANYTHING TO END YOUR LIFE?: NO
1. SINCE LAST CONTACT, HAVE YOU WISHED YOU WERE DEAD OR WISHED YOU COULD GO TO SLEEP AND NOT WAKE UP?: NO
2. HAVE YOU ACTUALLY HAD ANY THOUGHTS OF KILLING YOURSELF?: NO

## 2024-05-22 ASSESSMENT — PAIN - FUNCTIONAL ASSESSMENT: PAIN_FUNCTIONAL_ASSESSMENT: 0-10

## 2024-05-22 ASSESSMENT — PAIN SCALES - GENERAL: PAINLEVEL_OUTOF10: 0 - NO PAIN

## 2024-05-22 NOTE — GROUP NOTE
"Group Topic: Leisure Skills   Group Date: 5/22/2024  Start Time: 1100  End Time: 1150  Facilitators: JEN Gusman   Department: [facility] REHAB THERAPY VIRTUAL    Number of Participants: 9   Group Focus: Leisure Awareness, Icebreaker, leisure skills and social skills  Treatment Modality: Recreation Therapy   Interventions utilized were: L.C.R. (Dice Game), Icebreaker/Get to Know You Questions, leisure development and story telling  Purpose: Leisure Awareness, Social Stimulation, Cognitive Stimulation, communication skills    Name: Josué Pastor YOB: 1965   MR: [id number]      Facilitator: Recreational Therapist  Level of Participation: active  Quality of Participation: cooperative, distractible, and initiates communication  Interactions with others: appropriate  Mood/Affect: anxious and appropriate  Triggers (if applicable): N/A  Cognition:  capable, poor STM   Progress: Moderate  Comments: This session involved a leisure activity called \"L.C.R. and Icebreaker questions\".  The activity involved cognitive tasks, social interactions, healthy competition, leisure awareness, and a pleasurable experience. All participants were encouraged to listen to the rules, ask questions as needed, and participate in the activity to the best of their abilities.    Patient attended the entire session and completed all group tasks needing minimal assistance. Patient appeared slightly more anxious than usual. He displayed poor concentration and some STM issues with following/remembering rules to the activity.     Plan: continue with services      "
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
## 2024-05-22 NOTE — NURSING NOTE
"Pt interview in the front [facility] [place]  Pt is playing chess with another patient  Pt stated his \"day was great\"  Pt denied everything at this time  Pt is being discharged tomorrow  Pt stated his goal \" get sleep\"  His strength \"I talk to people\"  and his coping skill \"I read books\"  Pt is appropriate with his answers to the questions at this time   "

## 2024-05-22 NOTE — NURSING NOTE
"0830- The pt was calm and cooperative during the interview that took place in the pt's room away from his peers for privacy. The pt denies anxiety and depression rating them 0/10, denies SI, HI and AVH at this time as well as pain rating it a 0/10. The last bowel movement was, \"yesterday\" 5/21/24. Coping skills, \"writing and reading.\" Goal for the day, \"Transition home and keep my anxiety in check.\" Pt strength, \"Kindness.\" The pt was medication compliant with his morning medications. Q 15 minute monitoring continued.     1200- This nurse reviewed the pt's paperwork with him and reinforced the importance of attending his follow up appointments as that is where he will receive his medication refills. The pt stated, \"Yes, I'll be going to both appointments.\" The pt verbalized he had no further questions. Q 15 minute monitoring continued.    1325- The pt was discharged from the unit with all of his belongings and discharge papers.   "

## 2024-05-22 NOTE — NURSING NOTE
Pt took his night time medications  He watched TV and then went to bed  and slept all night long  Pt had an uneventful night  Pt to be discharged today

## 2024-05-22 NOTE — PROGRESS NOTES
"Occupational Therapy     REHAB Therapy Assessment & Treatment    Patient Name: Josué Pastor  MRN: [id number]  Today's Date: 5/22/2024    Attendance:  Attendance  Activity:  (Anxiety Reduction/Positive Affirmations: [phone] Coping Skills:[phone])  Participation: Active participation    Therapeutic Recreation:  Treatment Approach  Approach : Group therapy sessions    Pt attends 2/2 groups. Actively participates in both activities. Pt provided good insight and examples of positive affirmations, stating his favorite affirmation to the group \"Life is an echo, what you put out you receive back\". Pt also encourages other peers to participate and assists a more reserved pt in stating a positive coping skill, while also provider her a friendly compliment. He is eager to discharge and states he is happy to have new skills to utilize at home. Pt continues to demonstrate good progress toward goals and will benefit from continued OT services during this LOS.       Encounter Problems       Encounter Problems (Active)       OT Goals       Pt will explore and ID 1-2 strategies to manage stressors/symptoms of illness/ grief more effectively prior to discharge.  (Progressing)       Start:  05/14/24    Expected End:  06/04/24            Pt will ID/ utilize 1-2 ways to increase balance of activity/ re-involve self in functional daily routines/roles prior to discharge.  (Progressing)       Start:  05/14/24    Expected End:  06/04/24            Pt will explore/ ID 1-2 meaningful leisure activities to improve QOL for post discharge use.  (Progressing)       Start:  05/14/24    Expected End:  06/04/24            Pt will ID 2-3 effective ways to distract from crisis and ID stressors/ personal symptoms of stress in order to improve management of stress during daily activities.  (Progressing)       Start:  05/14/24    Expected End:  06/04/24            Pt will ID 2 community resources/programs to join/attend after D/C to improve " their support system (Progressing)       Start:  05/14/24    Expected End:  06/04/24

## 2024-05-23 LAB
ATRIAL RATE: 64 BPM
P AXIS: 67 DEGREES
P OFFSET: 187 MS
P ONSET: 139 MS
PR INTERVAL: 176 MS
Q ONSET: 227 MS
QRS COUNT: 11 BEATS
QRS DURATION: 140 MS
QT INTERVAL: 446 MS
QTC CALCULATION(BAZETT): 460 MS
QTC FREDERICIA: 455 MS
R AXIS: -69 DEGREES
T AXIS: 43 DEGREES
T OFFSET: 450 MS
VENTRICULAR RATE: 64 BPM

## 2024-05-24 NOTE — SIGNIFICANT EVENT
Follow Up Phone Call    Outgoing phone call    Spoke to: Josué aPstor Relationship:self   Phone number: [phone]      Outcome: No answer/busy signal   No chief complaint on file.         Diagnosis:Not applicable

## 2024-06-14 PROCEDURE — RXMED WILLOW AMBULATORY MEDICATION CHARGE

## 2024-06-17 ENCOUNTER — PHARMACY VISIT (OUTPATIENT)
Dept: PHARMACY | Facility: CLINIC | Age: 59
End: 2024-06-17
Payer: COMMERCIAL

## 2024-07-12 DIAGNOSIS — E11.9 TYPE 2 DIABETES MELLITUS WITHOUT COMPLICATION, UNSPECIFIED WHETHER LONG TERM INSULIN USE (MULTI): ICD-10-CM

## 2024-07-12 DIAGNOSIS — F32.89 OTHER SPECIFIED DEPRESSIVE DISORDER: ICD-10-CM

## 2024-07-12 PROCEDURE — RXMED WILLOW AMBULATORY MEDICATION CHARGE

## 2024-07-12 RX ORDER — SERTRALINE HYDROCHLORIDE 100 MG/1
200 TABLET, FILM COATED ORAL DAILY
Qty: 60 TABLET | Refills: 0 | Status: CANCELLED | OUTPATIENT
Start: 2024-07-12 | End: 2024-08-11

## 2024-07-13 ENCOUNTER — PHARMACY VISIT (OUTPATIENT)
Dept: PHARMACY | Facility: CLINIC | Age: 59
End: 2024-07-13
Payer: COMMERCIAL

## 2024-07-15 DIAGNOSIS — L40.9 PSORIASIS: Primary | ICD-10-CM

## 2024-07-15 PROCEDURE — RXMED WILLOW AMBULATORY MEDICATION CHARGE

## 2024-07-15 RX ORDER — TRIAMCINOLONE ACETONIDE 1 MG/G
OINTMENT TOPICAL 2 TIMES DAILY
Qty: 454 G | Refills: 0 | Status: SHIPPED | OUTPATIENT
Start: 2024-07-15

## 2024-07-15 RX ORDER — SERTRALINE HYDROCHLORIDE 100 MG/1
200 TABLET, FILM COATED ORAL DAILY
Qty: 60 TABLET | Refills: 0 | Status: SHIPPED | OUTPATIENT
Start: 2024-07-15 | End: 2024-08-15

## 2024-07-15 RX ORDER — INSULIN GLARGINE 100 [IU]/ML
30 INJECTION, SOLUTION SUBCUTANEOUS NIGHTLY
Qty: 15 ML | Refills: 0 | Status: SHIPPED | OUTPATIENT
Start: 2024-07-15 | End: 2024-09-04

## 2024-07-16 ENCOUNTER — PHARMACY VISIT (OUTPATIENT)
Dept: PHARMACY | Facility: CLINIC | Age: 59
End: 2024-07-16
Payer: COMMERCIAL

## 2024-07-16 PROCEDURE — RXMED WILLOW AMBULATORY MEDICATION CHARGE

## 2024-07-16 RX ORDER — BREXPIPRAZOLE 1 MG/1
TABLET ORAL
Qty: 90 TABLET | Refills: 0 | OUTPATIENT
Start: 2024-07-16

## 2024-07-19 ENCOUNTER — PHARMACY VISIT (OUTPATIENT)
Dept: PHARMACY | Facility: CLINIC | Age: 59
End: 2024-07-19
Payer: COMMERCIAL

## 2024-07-22 ENCOUNTER — OFFICE VISIT (OUTPATIENT)
Dept: PRIMARY CARE | Facility: CLINIC | Age: 59
End: 2024-07-22
Payer: COMMERCIAL

## 2024-07-22 VITALS
WEIGHT: 207.6 LBS | DIASTOLIC BLOOD PRESSURE: 68 MMHG | HEART RATE: 54 BPM | TEMPERATURE: 97.9 F | SYSTOLIC BLOOD PRESSURE: 136 MMHG | BODY MASS INDEX: 29.72 KG/M2 | HEIGHT: 70 IN | OXYGEN SATURATION: 96 % | RESPIRATION RATE: 18 BRPM

## 2024-07-22 DIAGNOSIS — E11.69 TYPE 2 DIABETES MELLITUS WITH OTHER SPECIFIED COMPLICATION, WITHOUT LONG-TERM CURRENT USE OF INSULIN (MULTI): ICD-10-CM

## 2024-07-22 DIAGNOSIS — Z79.4 TYPE 2 DIABETES MELLITUS WITHOUT COMPLICATION, WITH LONG-TERM CURRENT USE OF INSULIN (MULTI): Primary | ICD-10-CM

## 2024-07-22 DIAGNOSIS — R21 RASH: ICD-10-CM

## 2024-07-22 DIAGNOSIS — Z12.5 ENCOUNTER FOR PROSTATE CANCER SCREENING: ICD-10-CM

## 2024-07-22 DIAGNOSIS — E11.9 TYPE 2 DIABETES MELLITUS WITHOUT COMPLICATION, WITH LONG-TERM CURRENT USE OF INSULIN (MULTI): Primary | ICD-10-CM

## 2024-07-22 DIAGNOSIS — E78.2 MIXED HYPERLIPIDEMIA: ICD-10-CM

## 2024-07-22 DIAGNOSIS — I10 PRIMARY HYPERTENSION: ICD-10-CM

## 2024-07-22 DIAGNOSIS — Z00.00 ROUTINE MEDICAL EXAM: ICD-10-CM

## 2024-07-22 DIAGNOSIS — L40.9 PSORIASIS: ICD-10-CM

## 2024-07-22 DIAGNOSIS — E11.9 TYPE 2 DIABETES MELLITUS WITHOUT COMPLICATION, UNSPECIFIED WHETHER LONG TERM INSULIN USE (MULTI): ICD-10-CM

## 2024-07-22 LAB — POC HEMOGLOBIN A1C: 6.9 % (ref 4.2–6.5)

## 2024-07-22 PROCEDURE — 4010F ACE/ARB THERAPY RXD/TAKEN: CPT | Performed by: NURSE PRACTITIONER

## 2024-07-22 PROCEDURE — 3078F DIAST BP <80 MM HG: CPT | Performed by: NURSE PRACTITIONER

## 2024-07-22 PROCEDURE — 3075F SYST BP GE 130 - 139MM HG: CPT | Performed by: NURSE PRACTITIONER

## 2024-07-22 PROCEDURE — 99214 OFFICE O/P EST MOD 30 MIN: CPT | Performed by: NURSE PRACTITIONER

## 2024-07-22 PROCEDURE — 3008F BODY MASS INDEX DOCD: CPT | Performed by: NURSE PRACTITIONER

## 2024-07-22 PROCEDURE — 1036F TOBACCO NON-USER: CPT | Performed by: NURSE PRACTITIONER

## 2024-07-22 PROCEDURE — 83036 HEMOGLOBIN GLYCOSYLATED A1C: CPT | Performed by: NURSE PRACTITIONER

## 2024-07-22 PROCEDURE — RXMED WILLOW AMBULATORY MEDICATION CHARGE

## 2024-07-22 PROCEDURE — 3048F LDL-C <100 MG/DL: CPT | Performed by: NURSE PRACTITIONER

## 2024-07-22 RX ORDER — TRIAMCINOLONE ACETONIDE 1 MG/G
OINTMENT TOPICAL 2 TIMES DAILY
Qty: 454 G | Refills: 0 | Status: SHIPPED | OUTPATIENT
Start: 2024-07-22

## 2024-07-22 RX ORDER — METFORMIN HYDROCHLORIDE 500 MG/1
1000 TABLET, EXTENDED RELEASE ORAL 2 TIMES DAILY
Qty: 360 TABLET | Refills: 1 | Status: SHIPPED | OUTPATIENT
Start: 2024-07-22

## 2024-07-22 RX ORDER — GABAPENTIN 300 MG/1
300 CAPSULE ORAL DAILY
Qty: 90 CAPSULE | Refills: 3 | Status: SHIPPED | OUTPATIENT
Start: 2024-07-22 | End: 2025-07-22

## 2024-07-22 RX ORDER — LOSARTAN POTASSIUM 50 MG/1
TABLET ORAL
Qty: 120 TABLET | Refills: 1 | Status: SHIPPED | OUTPATIENT
Start: 2024-07-22

## 2024-07-22 RX ORDER — DULAGLUTIDE 3 MG/.5ML
1 INJECTION, SOLUTION SUBCUTANEOUS
Qty: 2 ML | Refills: 5 | Status: CANCELLED | OUTPATIENT
Start: 2024-07-22 | End: 2025-07-22

## 2024-07-22 RX ORDER — INSULIN GLARGINE 100 [IU]/ML
30 INJECTION, SOLUTION SUBCUTANEOUS NIGHTLY
Qty: 15 ML | Refills: 3 | Status: SHIPPED | OUTPATIENT
Start: 2024-07-22 | End: 2024-08-21

## 2024-07-22 RX ORDER — PEN NEEDLE, DIABETIC 30 GX3/16"
1 NEEDLE, DISPOSABLE MISCELLANEOUS DAILY
Qty: 100 EACH | Refills: 1 | Status: SHIPPED | OUTPATIENT
Start: 2024-07-22 | End: 2025-02-07

## 2024-07-22 RX ORDER — METOPROLOL SUCCINATE 100 MG/1
100 TABLET, EXTENDED RELEASE ORAL DAILY
Qty: 90 TABLET | Refills: 1 | Status: SHIPPED | OUTPATIENT
Start: 2024-07-22

## 2024-07-22 RX ORDER — ATORVASTATIN CALCIUM 80 MG/1
80 TABLET, FILM COATED ORAL DAILY
Qty: 90 TABLET | Refills: 1 | Status: SHIPPED | OUTPATIENT
Start: 2024-07-22

## 2024-07-22 ASSESSMENT — PAIN SCALES - GENERAL: PAINLEVEL: 0-NO PAIN

## 2024-07-22 ASSESSMENT — LIFESTYLE VARIABLES: HOW MANY STANDARD DRINKS CONTAINING ALCOHOL DO YOU HAVE ON A TYPICAL DAY: PATIENT DOES NOT DRINK

## 2024-07-22 ASSESSMENT — ENCOUNTER SYMPTOMS: VISUAL CHANGE: 0

## 2024-07-22 NOTE — PROGRESS NOTES
"Subjective   Patient ID: Alfred Pastor is a 58 y.o. male who presents for Diabetes (Pt here for diabetic check/Ha1c 6.9).    Here for dm visit, has retired, has a mental breakdownm was in [facility] and is beeing seen by [facility]. Stress from work and gettinglet go to much, doing better now     Diabetes  He presents for his follow-up diabetic visit. He has type 2 diabetes mellitus. No MedicAlert identification noted. His disease course has been improving. Pertinent negatives for diabetes include no visual change. Symptoms are stable. Risk factors for coronary artery disease include diabetes mellitus, stress, male sex, hypertension and dyslipidemia. Current diabetic treatment includes diet, insulin injections and oral agent (dual therapy). He is compliant with treatment all of the time. He is following a generally healthy diet. He has not had a previous visit with a dietitian. He participates in exercise daily. An ACE inhibitor/angiotensin II receptor blocker is being taken. He does not see a podiatrist.Eye exam is current.        Review of Systems   Psychiatric/Behavioral:          Has had a lot of stress's on med's seeing counselor , made difficult decision needs to stop working , vision issues really an issues       Objective   /68   Pulse 54   Temp 36.6 °C (97.9 °F)   Resp 18   Ht 1.778 m (5' 10\")   Wt 94.2 kg (207 lb 9.6 oz)   SpO2 96%   BMI 29.79 kg/m²     Physical Exam  Constitutional:       Appearance: Normal appearance. He is normal weight.   HENT:      Right Ear: Tympanic membrane normal.      Left Ear: Tympanic membrane normal.      Nose: Nose normal.      Mouth/Throat:      Mouth: Mucous membranes are dry.   Eyes:      Extraocular Movements: Extraocular movements intact.      Pupils: Pupils are equal, round, and reactive to light.   Cardiovascular:      Rate and Rhythm: Normal rate and regular rhythm.      Pulses: Normal pulses.      Heart sounds: Normal heart sounds.   Pulmonary:    " "  Effort: Pulmonary effort is normal.      Breath sounds: Normal breath sounds.   Abdominal:      General: Abdomen is flat. Bowel sounds are normal.   Musculoskeletal:      Cervical back: Normal range of motion.   Feet:      Right foot:      Skin integrity: Dry skin present.      Toenail Condition: Right toenails are abnormally thick.      Left foot:      Skin integrity: Dry skin present.      Toenail Condition: Left toenails are abnormally thick.      Comments: Decreased pulses  Skin:     Findings: Rash present.      Comments: On feet red raised lesions dry itching   Neurological:      Mental Status: He is alert and oriented to person, place, and time.   Psychiatric:         Mood and Affect: Mood normal.         Behavior: Behavior normal.      Comments: Has been through a great deal stresses at work vision issues coping well now very difficult time         Assessment/Plan   Problem List Items Addressed This Visit             ICD-10-CM    Hypertension I10    Relevant Medications    metoprolol succinate XL (Toprol-XL) 100 mg 24 hr tablet    losartan (Cozaar) 50 mg tablet    Other Relevant Orders    CBC and Auto Differential    TSH with reflex to Free T4 if abnormal    Comprehensive Metabolic Panel    CT cardiac scoring wo IV contrast    Psoriasis L40.9    Relevant Medications    triamcinolone (Kenalog) 0.1 % ointment    Type 2 diabetes mellitus without complication (Multi) - Primary E11.9    Relevant Medications    pen needle, diabetic 31 gauge x 3/16\" needle    metFORMIN XR (Glucophage-XR) 500 mg 24 hr tablet    insulin glargine (Lantus Solostar U-100 Insulin) 100 unit/mL (3 mL) pen    atorvastatin (Lipitor) 80 mg tablet    Other Relevant Orders    POCT glycosylated hemoglobin (Hb A1C) manually resulted     Other Visit Diagnoses         Codes    Mixed hyperlipidemia     E78.2    Relevant Orders    Lipid Panel    Type 2 diabetes mellitus with other specified complication, without long-term current use of insulin " (Multi)     E11.69    Relevant Medications    gabapentin (Neurontin) 300 mg capsule    Encounter for prostate cancer screening     Z12.5    Relevant Orders    PSA, total and free    Routine medical exam     Z00.00    Relevant Orders    CBC and Auto Differential    TSH with reflex to Free T4 if abnormal    Comprehensive Metabolic Panel    Lipid Panel    PSA, total and free    Vitamin D 25-Hydroxy,Total (for eval of Vitamin D levels)    Rash     R21    Relevant Medications    triamcinolone (Kenalog) 0.1 % ointment

## 2024-07-22 NOTE — PATIENT INSTRUCTIONS
Great to see you. Discussed if not able to get health insurance call. Will try and get him help with med's, discussed free clinic, will call with labs met with corina , will stop Trulicity recheck in 3 months, please call  if you need anything.

## 2024-07-25 ENCOUNTER — PHARMACY VISIT (OUTPATIENT)
Dept: PHARMACY | Facility: CLINIC | Age: 59
End: 2024-07-25
Payer: COMMERCIAL

## 2024-09-23 PROCEDURE — RXMED WILLOW AMBULATORY MEDICATION CHARGE

## 2024-09-25 ENCOUNTER — PHARMACY VISIT (OUTPATIENT)
Dept: PHARMACY | Facility: CLINIC | Age: 59
End: 2024-09-25
Payer: COMMERCIAL

## 2024-10-08 ENCOUNTER — TELEPHONE (OUTPATIENT)
Dept: PRIMARY CARE | Facility: CLINIC | Age: 59
End: 2024-10-08
Payer: COMMERCIAL

## 2024-10-08 DIAGNOSIS — Z79.4 TYPE 2 DIABETES MELLITUS WITHOUT COMPLICATION, WITH LONG-TERM CURRENT USE OF INSULIN (MULTI): Primary | ICD-10-CM

## 2024-10-08 DIAGNOSIS — E11.9 TYPE 2 DIABETES MELLITUS WITHOUT COMPLICATION, WITH LONG-TERM CURRENT USE OF INSULIN (MULTI): Primary | ICD-10-CM

## 2024-10-08 RX ORDER — BLOOD-GLUCOSE SENSOR
EACH MISCELLANEOUS
Qty: 3 EACH | Refills: 11 | Status: SHIPPED | OUTPATIENT
Start: 2024-10-08

## 2024-10-08 NOTE — TELEPHONE ENCOUNTER
Dexcom covered but copay $112/3 sensors. Attempted to reach patient to discuss, out of pocket Nidhi cost would be cheaper at this point. Unable to reach, states line is now disconnected. Can send a rx for the Dexcom still and patient can decide if he wants to use.

## 2024-10-17 PROCEDURE — RXMED WILLOW AMBULATORY MEDICATION CHARGE

## 2024-10-19 ENCOUNTER — PHARMACY VISIT (OUTPATIENT)
Dept: PHARMACY | Facility: CLINIC | Age: 59
End: 2024-10-19
Payer: COMMERCIAL

## 2024-10-21 ENCOUNTER — OFFICE VISIT (OUTPATIENT)
Dept: PRIMARY CARE | Facility: CLINIC | Age: 59
End: 2024-10-21
Payer: COMMERCIAL

## 2024-10-21 VITALS
TEMPERATURE: 97.2 F | OXYGEN SATURATION: 98 % | SYSTOLIC BLOOD PRESSURE: 126 MMHG | BODY MASS INDEX: 31.35 KG/M2 | RESPIRATION RATE: 18 BRPM | HEART RATE: 67 BPM | WEIGHT: 219 LBS | HEIGHT: 70 IN | DIASTOLIC BLOOD PRESSURE: 70 MMHG
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
## 2024-10-21 DIAGNOSIS — E11.319 TYPE 2 DIABETES MELLITUS WITH RETINOPATHY, WITH LONG-TERM CURRENT USE OF INSULIN, MACULAR EDEMA PRESENCE UNSPECIFIED, UNSPECIFIED LATERALITY, UNSPECIFIED RETINOPATHY SEVERITY: ICD-10-CM

## 2024-10-21 DIAGNOSIS — Z79.4 TYPE 2 DIABETES MELLITUS WITH RETINOPATHY, WITH LONG-TERM CURRENT USE OF INSULIN, MACULAR EDEMA PRESENCE UNSPECIFIED, UNSPECIFIED LATERALITY, UNSPECIFIED RETINOPATHY SEVERITY: ICD-10-CM

## 2024-10-21 DIAGNOSIS — G47.9 SLEEP DISTURBANCE: ICD-10-CM

## 2024-10-21 DIAGNOSIS — H54.8 LEGALLY BLIND IN RIGHT EYE, AS DEFINED IN USA: ICD-10-CM

## 2024-10-21 DIAGNOSIS — E78.2 MIXED HYPERLIPIDEMIA: Primary | ICD-10-CM

## 2024-10-21 DIAGNOSIS — E11.69 TYPE 2 DIABETES MELLITUS WITH OTHER SPECIFIED COMPLICATION, WITHOUT LONG-TERM CURRENT USE OF INSULIN: ICD-10-CM

## 2024-10-21 DIAGNOSIS — F32.89 OTHER SPECIFIED DEPRESSIVE DISORDER: ICD-10-CM

## 2024-10-21 DIAGNOSIS — F41.9 ANXIETY: ICD-10-CM

## 2024-10-21 LAB — POC HEMOGLOBIN A1C: 8.9 % (ref 4.2–6.5)

## 2024-10-21 PROCEDURE — 3008F BODY MASS INDEX DOCD: CPT | Performed by: NURSE PRACTITIONER

## 2024-10-21 PROCEDURE — 99214 OFFICE O/P EST MOD 30 MIN: CPT | Performed by: NURSE PRACTITIONER

## 2024-10-21 PROCEDURE — 3074F SYST BP LT 130 MM HG: CPT | Performed by: NURSE PRACTITIONER

## 2024-10-21 PROCEDURE — RXMED WILLOW AMBULATORY MEDICATION CHARGE

## 2024-10-21 PROCEDURE — 3048F LDL-C <100 MG/DL: CPT | Performed by: NURSE PRACTITIONER

## 2024-10-21 PROCEDURE — 83036 HEMOGLOBIN GLYCOSYLATED A1C: CPT | Performed by: NURSE PRACTITIONER

## 2024-10-21 PROCEDURE — 4010F ACE/ARB THERAPY RXD/TAKEN: CPT | Performed by: NURSE PRACTITIONER

## 2024-10-21 PROCEDURE — 3078F DIAST BP <80 MM HG: CPT | Performed by: NURSE PRACTITIONER

## 2024-10-21 RX ORDER — SERTRALINE HYDROCHLORIDE 100 MG/1
200 TABLET, FILM COATED ORAL DAILY
Qty: 60 TABLET | Refills: 0 | Status: SHIPPED | OUTPATIENT
Start: 2024-10-21 | End: 2024-11-21

## 2024-10-21 RX ORDER — DULAGLUTIDE 3 MG/.5ML
1 INJECTION, SOLUTION SUBCUTANEOUS
Qty: 2 ML | Refills: 5 | Status: SHIPPED | OUTPATIENT
Start: 2024-10-21 | End: 2025-10-21

## 2024-10-21 RX ORDER — TRAZODONE HYDROCHLORIDE 50 MG/1
50 TABLET ORAL NIGHTLY PRN
Qty: 30 TABLET | Refills: 5 | Status: SHIPPED | OUTPATIENT
Start: 2024-10-21 | End: 2025-10-21

## 2024-10-21 RX ORDER — ALPRAZOLAM 0.5 MG/1
0.5 TABLET ORAL NIGHTLY PRN
Qty: 20 TABLET | Refills: 0 | Status: SHIPPED | OUTPATIENT
Start: 2024-10-21 | End: 2024-11-11

## 2024-10-21 ASSESSMENT — PATIENT HEALTH QUESTIONNAIRE - PHQ9
1. LITTLE INTEREST OR PLEASURE IN DOING THINGS: NOT AT ALL
SUM OF ALL RESPONSES TO PHQ9 QUESTIONS 1 AND 2: 0
2. FEELING DOWN, DEPRESSED OR HOPELESS: NOT AT ALL

## 2024-10-21 ASSESSMENT — PAIN SCALES - GENERAL: PAINLEVEL_OUTOF10: 0-NO PAIN

## 2024-10-21 NOTE — PROGRESS NOTES
"Subjective   Patient ID: Alfred Pastor is a 59 y.o. male who presents for Anxiety (PT IS HERE TO DISCUSS ANXIETY OVER THE PAST FEW WEEKS. PT STATES THIS STARTED WHEN HE FILED FOR DISABILITY/ SOCIAL SECURITY ) and Diabetes (PT IS DUE FOR DM CHECK ).    PT HERE FOR ANXIETY,HAS REALLY BEEN HAVING ANXIETY NOT SLEEPING WORRYING ALTHETIME,HERE FOR A DM CHECKALSO HAS BEEN OFF MEDS DUE TO INSURANCE ISSUES,     Anxiety  Presents for initial visit. Onset was 6 to 12 months ago. The problem has been gradually worsening. Symptoms include depressed mood, insomnia and irritability. Symptoms occur most days. The severity of symptoms is interfering with daily activities. The quality of sleep is poor.     Risk factors include a major life event. Past treatments include lifestyle changes. Compliance with prior treatments has been good.   Diabetes  He presents for his follow-up diabetic visit. He has type 2 diabetes mellitus.        Review of Systems   Constitutional:  Positive for irritability.   Psychiatric/Behavioral:  The patient has insomnia.         Having panic attacks worrying       Objective   /70   Pulse 67   Temp 36.2 °C (97.2 °F)   Resp 18   Ht 1.778 m (5' 10\")   Wt 99.3 kg (219 lb)   SpO2 98%   BMI 31.42 kg/m²     Physical Exam  Constitutional:       Appearance: Normal appearance.   HENT:      Right Ear: Tympanic membrane normal.   Cardiovascular:      Rate and Rhythm: Normal rate and regular rhythm.      Pulses: Normal pulses.      Heart sounds: Normal heart sounds.   Pulmonary:      Effort: Pulmonary effort is normal.      Breath sounds: Normal breath sounds.   Feet:      Right foot:      Skin integrity: Dry skin present.      Toenail Condition: Right toenails are abnormally thick.      Left foot:      Skin integrity: Dry skin present.      Toenail Condition: Left toenails are abnormally thick.      Comments: Pulses ok  Skin:     General: Skin is warm and dry.   Neurological:      Mental Status: He is " alert and oriented to person, place, and time.         Assessment/Plan   Problem List Items Addressed This Visit    None  Visit Diagnoses         Codes    Mixed hyperlipidemia    -  Primary E78.2    Type 2 diabetes mellitus with retinopathy, with long-term current use of insulin, macular edema presence unspecified, unspecified laterality, unspecified retinopathy severity     E11.319, Z79.4    Relevant Orders    POCT glycosylated hemoglobin (Hb A1C) manually resulted (Completed)    Other specified depressive disorder     F32.89    Relevant Medications    sertraline (Zoloft) 100 mg tablet    Anxiety     F41.9    Relevant Medications    sertraline (Zoloft) 100 mg tablet    traZODone (Desyrel) 50 mg tablet    ALPRAZolam (Xanax) 0.5 mg tablet    Sleep disturbance     G47.9    Fall, subsequent encounter     W19.XXXD    Relevant Orders    Cane    Legally blind in right eye, as defined in [place]     H54.8    Relevant Orders    Disability Placard    Cane    Type 2 diabetes mellitus with other specified complication, without long-term current use of insulin     E11.69    Relevant Medications    dulaglutide (Trulicity) 3 mg/0.5 mL pen injector        Discussed dm worsened. Starting back on meds. Retuen 6 weeks to evlauate if  meds helping with anxiety, contract signed

## 2024-10-22 ENCOUNTER — PHARMACY VISIT (OUTPATIENT)
Dept: PHARMACY | Facility: CLINIC | Age: 59
End: 2024-10-22
Payer: COMMERCIAL

## 2024-10-22 ASSESSMENT — ENCOUNTER SYMPTOMS
INSOMNIA: 1
DEPRESSED MOOD: 1
IRRITABILITY: 1

## 2024-10-29 PROCEDURE — RXMED WILLOW AMBULATORY MEDICATION CHARGE

## 2024-10-30 DIAGNOSIS — E11.319 TYPE 2 DIABETES MELLITUS WITH RETINOPATHY, WITH LONG-TERM CURRENT USE OF INSULIN, MACULAR EDEMA PRESENCE UNSPECIFIED, UNSPECIFIED LATERALITY, UNSPECIFIED RETINOPATHY SEVERITY: Primary | ICD-10-CM

## 2024-10-30 DIAGNOSIS — Z79.4 TYPE 2 DIABETES MELLITUS WITH RETINOPATHY, WITH LONG-TERM CURRENT USE OF INSULIN, MACULAR EDEMA PRESENCE UNSPECIFIED, UNSPECIFIED LATERALITY, UNSPECIFIED RETINOPATHY SEVERITY: Primary | ICD-10-CM

## 2024-11-08 PROCEDURE — RXMED WILLOW AMBULATORY MEDICATION CHARGE

## 2024-11-09 ENCOUNTER — PHARMACY VISIT (OUTPATIENT)
Dept: PHARMACY | Facility: CLINIC | Age: 59
End: 2024-11-09
Payer: COMMERCIAL

## 2024-12-26 DIAGNOSIS — F41.9 ANXIETY: ICD-10-CM

## 2024-12-26 DIAGNOSIS — F32.89 OTHER SPECIFIED DEPRESSIVE DISORDER: ICD-10-CM

## 2024-12-26 PROCEDURE — RXMED WILLOW AMBULATORY MEDICATION CHARGE

## 2024-12-28 ENCOUNTER — PHARMACY VISIT (OUTPATIENT)
Dept: PHARMACY | Facility: CLINIC | Age: 59
End: 2024-12-28
Payer: COMMERCIAL

## 2024-12-28 PROCEDURE — RXMED WILLOW AMBULATORY MEDICATION CHARGE

## 2024-12-30 RX ORDER — SERTRALINE HYDROCHLORIDE 100 MG/1
200 TABLET, FILM COATED ORAL DAILY
Qty: 60 TABLET | Refills: 0 | Status: SHIPPED | OUTPATIENT
Start: 2024-12-30 | End: 2025-02-03

## 2024-12-30 RX ORDER — ALPRAZOLAM 0.5 MG/1
0.5 TABLET ORAL NIGHTLY PRN
Qty: 20 TABLET | Refills: 0 | Status: SHIPPED | OUTPATIENT
Start: 2024-12-30 | End: 2025-01-24

## 2024-12-31 PROCEDURE — RXMED WILLOW AMBULATORY MEDICATION CHARGE

## 2025-01-04 ENCOUNTER — PHARMACY VISIT (OUTPATIENT)
Dept: PHARMACY | Facility: CLINIC | Age: 60
End: 2025-01-04
Payer: COMMERCIAL

## 2025-01-20 DIAGNOSIS — F41.9 ANXIETY: ICD-10-CM

## 2025-01-20 DIAGNOSIS — F32.89 OTHER SPECIFIED DEPRESSIVE DISORDER: ICD-10-CM

## 2025-01-20 PROCEDURE — RXMED WILLOW AMBULATORY MEDICATION CHARGE

## 2025-01-21 PROCEDURE — RXMED WILLOW AMBULATORY MEDICATION CHARGE

## 2025-01-21 RX ORDER — SERTRALINE HYDROCHLORIDE 100 MG/1
200 TABLET, FILM COATED ORAL DAILY
Qty: 60 TABLET | Refills: 0 | Status: SHIPPED | OUTPATIENT
Start: 2025-01-21 | End: 2025-02-20

## 2025-01-21 RX ORDER — ALPRAZOLAM 0.5 MG/1
0.5 TABLET ORAL NIGHTLY PRN
Qty: 10 TABLET | Refills: 0 | Status: SHIPPED | OUTPATIENT
Start: 2025-01-21 | End: 2025-02-10

## 2025-01-27 ENCOUNTER — PHARMACY VISIT (OUTPATIENT)
Dept: PHARMACY | Facility: CLINIC | Age: 60
End: 2025-01-27
Payer: COMMERCIAL

## 2025-02-03 PROCEDURE — RXMED WILLOW AMBULATORY MEDICATION CHARGE

## 2025-02-07 PROCEDURE — RXMED WILLOW AMBULATORY MEDICATION CHARGE

## 2025-02-08 ENCOUNTER — PHARMACY VISIT (OUTPATIENT)
Dept: PHARMACY | Facility: CLINIC | Age: 60
End: 2025-02-08
Payer: COMMERCIAL

## 2025-03-06 DIAGNOSIS — F41.9 ANXIETY: ICD-10-CM

## 2025-03-06 DIAGNOSIS — F32.89 OTHER SPECIFIED DEPRESSIVE DISORDER: ICD-10-CM

## 2025-03-11 PROCEDURE — RXMED WILLOW AMBULATORY MEDICATION CHARGE

## 2025-03-11 RX ORDER — ALPRAZOLAM 0.5 MG/1
0.5 TABLET ORAL NIGHTLY PRN
Qty: 10 TABLET | Refills: 0 | Status: SHIPPED | OUTPATIENT
Start: 2025-03-11 | End: 2025-03-31

## 2025-03-11 RX ORDER — SERTRALINE HYDROCHLORIDE 100 MG/1
200 TABLET, FILM COATED ORAL DAILY
Qty: 60 TABLET | Refills: 0 | Status: SHIPPED | OUTPATIENT
Start: 2025-03-11 | End: 2025-04-12

## 2025-03-13 ENCOUNTER — OFFICE VISIT (OUTPATIENT)
Dept: PRIMARY CARE | Facility: CLINIC | Age: 60
End: 2025-03-13
Payer: COMMERCIAL

## 2025-03-13 ENCOUNTER — PHARMACY VISIT (OUTPATIENT)
Dept: PHARMACY | Facility: CLINIC | Age: 60
End: 2025-03-13
Payer: COMMERCIAL

## 2025-03-13 VITALS
WEIGHT: 213 LBS | DIASTOLIC BLOOD PRESSURE: 82 MMHG | HEART RATE: 63 BPM | SYSTOLIC BLOOD PRESSURE: 138 MMHG | BODY MASS INDEX: 30.49 KG/M2 | HEIGHT: 70 IN | TEMPERATURE: 97.7 F | RESPIRATION RATE: 18 BRPM | OXYGEN SATURATION: 96 %

## 2025-03-13 DIAGNOSIS — E11.69 TYPE 2 DIABETES MELLITUS WITH OTHER SPECIFIED COMPLICATION, WITHOUT LONG-TERM CURRENT USE OF INSULIN: ICD-10-CM

## 2025-03-13 DIAGNOSIS — I10 PRIMARY HYPERTENSION: ICD-10-CM

## 2025-03-13 DIAGNOSIS — I25.119 CORONARY ARTERY DISEASE INVOLVING NATIVE HEART WITH ANGINA PECTORIS, UNSPECIFIED VESSEL OR LESION TYPE: Primary | ICD-10-CM

## 2025-03-13 DIAGNOSIS — F41.9 ANXIETY: ICD-10-CM

## 2025-03-13 DIAGNOSIS — Z12.5 ENCOUNTER FOR PROSTATE CANCER SCREENING: ICD-10-CM

## 2025-03-13 DIAGNOSIS — H54.8 LEGALLY BLIND IN RIGHT EYE, AS DEFINED IN USA: ICD-10-CM

## 2025-03-13 DIAGNOSIS — R05.1 ACUTE COUGH: ICD-10-CM

## 2025-03-13 DIAGNOSIS — Z79.4 TYPE 2 DIABETES MELLITUS WITH HYPERGLYCEMIA, WITH LONG-TERM CURRENT USE OF INSULIN: ICD-10-CM

## 2025-03-13 DIAGNOSIS — Z11.59 ENCOUNTER FOR HEPATITIS C SCREENING TEST FOR LOW RISK PATIENT: ICD-10-CM

## 2025-03-13 DIAGNOSIS — E11.65 TYPE 2 DIABETES MELLITUS WITH HYPERGLYCEMIA, WITH LONG-TERM CURRENT USE OF INSULIN: ICD-10-CM

## 2025-03-13 DIAGNOSIS — E11.319 TYPE 2 DIABETES MELLITUS WITH RETINOPATHY, WITH LONG-TERM CURRENT USE OF INSULIN, MACULAR EDEMA PRESENCE UNSPECIFIED, UNSPECIFIED LATERALITY, UNSPECIFIED RETINOPATHY SEVERITY: ICD-10-CM

## 2025-03-13 DIAGNOSIS — E11.9 TYPE 2 DIABETES MELLITUS WITHOUT COMPLICATION, UNSPECIFIED WHETHER LONG TERM INSULIN USE (MULTI): ICD-10-CM

## 2025-03-13 DIAGNOSIS — G47.9 SLEEP DIFFICULTIES: ICD-10-CM

## 2025-03-13 DIAGNOSIS — Z79.4 TYPE 2 DIABETES MELLITUS WITH RETINOPATHY, WITH LONG-TERM CURRENT USE OF INSULIN, MACULAR EDEMA PRESENCE UNSPECIFIED, UNSPECIFIED LATERALITY, UNSPECIFIED RETINOPATHY SEVERITY: ICD-10-CM

## 2025-03-13 DIAGNOSIS — R06.02 SOB (SHORTNESS OF BREATH) ON EXERTION: ICD-10-CM

## 2025-03-13 DIAGNOSIS — E11.9 TYPE 2 DIABETES MELLITUS WITHOUT COMPLICATION, WITH LONG-TERM CURRENT USE OF INSULIN (MULTI): ICD-10-CM

## 2025-03-13 DIAGNOSIS — Z79.4 TYPE 2 DIABETES MELLITUS WITHOUT COMPLICATION, WITH LONG-TERM CURRENT USE OF INSULIN (MULTI): ICD-10-CM

## 2025-03-13 DIAGNOSIS — E55.9 VITAMIN D DEFICIENCY: ICD-10-CM

## 2025-03-13 DIAGNOSIS — Z13.29 SCREENING FOR THYROID DISORDER: ICD-10-CM

## 2025-03-13 DIAGNOSIS — F32.A DEPRESSION, UNSPECIFIED DEPRESSION TYPE: ICD-10-CM

## 2025-03-13 DIAGNOSIS — E78.2 MIXED HYPERLIPIDEMIA: ICD-10-CM

## 2025-03-13 LAB — POC HEMOGLOBIN A1C: 8.3 % (ref 4.2–6.5)

## 2025-03-13 PROCEDURE — RXMED WILLOW AMBULATORY MEDICATION CHARGE

## 2025-03-13 PROCEDURE — 3075F SYST BP GE 130 - 139MM HG: CPT | Performed by: NURSE PRACTITIONER

## 2025-03-13 PROCEDURE — 83036 HEMOGLOBIN GLYCOSYLATED A1C: CPT | Performed by: NURSE PRACTITIONER

## 2025-03-13 PROCEDURE — 99214 OFFICE O/P EST MOD 30 MIN: CPT | Performed by: NURSE PRACTITIONER

## 2025-03-13 PROCEDURE — 3079F DIAST BP 80-89 MM HG: CPT | Performed by: NURSE PRACTITIONER

## 2025-03-13 PROCEDURE — 93000 ELECTROCARDIOGRAM COMPLETE: CPT | Performed by: NURSE PRACTITIONER

## 2025-03-13 PROCEDURE — 4010F ACE/ARB THERAPY RXD/TAKEN: CPT | Performed by: NURSE PRACTITIONER

## 2025-03-13 PROCEDURE — 3008F BODY MASS INDEX DOCD: CPT | Performed by: NURSE PRACTITIONER

## 2025-03-13 RX ORDER — BENZONATATE 200 MG/1
200 CAPSULE ORAL 3 TIMES DAILY PRN
Qty: 42 CAPSULE | Refills: 3 | Status: CANCELLED | OUTPATIENT
Start: 2025-03-13 | End: 2025-09-09

## 2025-03-13 RX ORDER — GABAPENTIN 300 MG/1
300 CAPSULE ORAL DAILY
Qty: 90 CAPSULE | Refills: 3 | Status: SHIPPED | OUTPATIENT
Start: 2025-03-13 | End: 2026-03-13

## 2025-03-13 RX ORDER — TRAZODONE HYDROCHLORIDE 50 MG/1
50 TABLET ORAL NIGHTLY PRN
Qty: 30 TABLET | Refills: 5 | Status: SHIPPED | OUTPATIENT
Start: 2025-03-13 | End: 2026-03-13

## 2025-03-13 RX ORDER — METHYLPREDNISOLONE 4 MG/1
TABLET ORAL
Qty: 21 TABLET | Refills: 0 | Status: CANCELLED | OUTPATIENT
Start: 2025-03-13 | End: 2025-03-19

## 2025-03-13 RX ORDER — ATORVASTATIN CALCIUM 80 MG/1
80 TABLET, FILM COATED ORAL DAILY
Qty: 90 TABLET | Refills: 1 | Status: SHIPPED | OUTPATIENT
Start: 2025-03-13

## 2025-03-13 RX ORDER — DOXYCYCLINE 100 MG/1
100 CAPSULE ORAL 2 TIMES DAILY
Qty: 20 CAPSULE | Refills: 0 | Status: CANCELLED | OUTPATIENT
Start: 2025-03-13 | End: 2025-03-23

## 2025-03-13 RX ORDER — DULAGLUTIDE 4.5 MG/.5ML
4.5 INJECTION, SOLUTION SUBCUTANEOUS WEEKLY
Qty: 6 ML | Refills: 3 | Status: SHIPPED | OUTPATIENT
Start: 2025-03-13

## 2025-03-13 RX ORDER — INSULIN GLARGINE 100 [IU]/ML
30 INJECTION, SOLUTION SUBCUTANEOUS NIGHTLY
Qty: 15 ML | Refills: 3 | Status: SHIPPED | OUTPATIENT
Start: 2025-03-13 | End: 2025-09-29

## 2025-03-13 RX ORDER — LOSARTAN POTASSIUM 100 MG/1
100 TABLET ORAL DAILY
Qty: 100 TABLET | Refills: 3 | Status: SHIPPED | OUTPATIENT
Start: 2025-03-13 | End: 2026-04-17

## 2025-03-13 RX ORDER — METFORMIN HYDROCHLORIDE 500 MG/1
1000 TABLET, EXTENDED RELEASE ORAL 2 TIMES DAILY
Qty: 360 TABLET | Refills: 1 | Status: SHIPPED | OUTPATIENT
Start: 2025-03-13

## 2025-03-13 ASSESSMENT — ENCOUNTER SYMPTOMS
SHORTNESS OF BREATH: 1
FATIGUE: 1

## 2025-03-13 ASSESSMENT — PAIN SCALES - GENERAL: PAINLEVEL_OUTOF10: 0-NO PAIN

## 2025-03-13 ASSESSMENT — PATIENT HEALTH QUESTIONNAIRE - PHQ9
1. LITTLE INTEREST OR PLEASURE IN DOING THINGS: NOT AT ALL
2. FEELING DOWN, DEPRESSED OR HOPELESS: NOT AT ALL
SUM OF ALL RESPONSES TO PHQ9 QUESTIONS 1 AND 2: 0

## 2025-03-13 NOTE — PROGRESS NOTES
"Subjective   Patient ID: Alfred Pastor is a 59 y.o. male who presents for Diabetes (PT IS HERE FOR DM CHECK ).    Here for dm check awaiting for disability, blind, unusual fatigue, sob on exertion,seeing cardilogist soon   Vision has not worsened!    Diabetes  He presents for his follow-up diabetic visit. He has type 2 diabetes mellitus. His disease course has been stable. Associated symptoms include fatigue. Symptoms are stable. Risk factors for coronary artery disease include diabetes mellitus, dyslipidemia, hypertension and sedentary lifestyle. Current diabetic treatment includes oral agent (dual therapy). He is compliant with treatment most of the time. He is following a generally healthy diet. He participates in exercise intermittently. An ACE inhibitor/angiotensin II receptor blocker is being taken. He does not see a podiatrist.Eye exam is current.        Review of Systems   Constitutional:  Positive for fatigue.   Respiratory:  Positive for shortness of breath.        Objective   /82   Pulse 63   Temp 36.5 °C (97.7 °F)   Resp 18   Ht 1.778 m (5' 10\")   Wt 96.6 kg (213 lb)   SpO2 96%   BMI 30.56 kg/m²     Physical Exam  Constitutional:       General: He is not in acute distress.     Appearance: Normal appearance.   HENT:      Right Ear: Tympanic membrane normal.      Left Ear: Tympanic membrane normal.      Nose: Congestion present.   Eyes:      Conjunctiva/sclera: Conjunctivae normal.   Cardiovascular:      Rate and Rhythm: Normal rate and regular rhythm.      Heart sounds: No murmur heard.  Pulmonary:      Breath sounds: Wheezing present.   Abdominal:      General: Bowel sounds are normal.   Feet:      Right foot:      Skin integrity: Callus and dry skin present.      Toenail Condition: Right toenails are abnormally thick and long.      Left foot:      Skin integrity: Callus and dry skin present.      Toenail Condition: Left toenails are abnormally thick and long.      Comments: Pulses  " ok  Skin:     General: Skin is warm.   Neurological:      Mental Status: He is alert and oriented to person, place, and time.   Psychiatric:         Behavior: Behavior normal.         Assessment/Plan   Problem List Items Addressed This Visit             ICD-10-CM    Depressed F32.A    Vitamin D deficiency E55.9     Other Visit Diagnoses         Codes    Coronary artery disease involving native heart with angina pectoris, unspecified vessel or lesion type    -  Primary I25.119    Type 2 diabetes mellitus with retinopathy, with long-term current use of insulin, macular edema presence unspecified, unspecified laterality, unspecified retinopathy severity     E11.319, Z79.4    Relevant Orders    POCT glycosylated hemoglobin (Hb A1C) manually resulted (Completed)    Encounter for prostate cancer screening     Z12.5    Mixed hyperlipidemia     E78.2    Type 2 diabetes mellitus with hyperglycemia, with long-term current use of insulin     E11.65, Z79.4    Sleep difficulties     G47.9    SOB (shortness of breath) on exertion     R06.02    Encounter for hepatitis C screening test for low risk patient     Z11.59    Screening for thyroid disorder     Z13.29    Legally blind in right eye, as defined in [place]     H54.8          Return in 3 months

## 2025-03-26 PROCEDURE — RXMED WILLOW AMBULATORY MEDICATION CHARGE

## 2025-03-29 ENCOUNTER — PHARMACY VISIT (OUTPATIENT)
Dept: PHARMACY | Facility: CLINIC | Age: 60
End: 2025-03-29
Payer: COMMERCIAL

## 2025-03-31 DIAGNOSIS — I10 PRIMARY HYPERTENSION: ICD-10-CM

## 2025-03-31 PROCEDURE — RXMED WILLOW AMBULATORY MEDICATION CHARGE

## 2025-03-31 RX ORDER — METOPROLOL SUCCINATE 100 MG/1
100 TABLET, EXTENDED RELEASE ORAL DAILY
Qty: 90 TABLET | Refills: 1 | Status: SHIPPED | OUTPATIENT
Start: 2025-03-31 | End: 2025-09-27

## 2025-04-01 ENCOUNTER — PHARMACY VISIT (OUTPATIENT)
Dept: PHARMACY | Facility: CLINIC | Age: 60
End: 2025-04-01
Payer: COMMERCIAL

## 2025-04-01 RX ORDER — METOPROLOL SUCCINATE 100 MG/1
100 TABLET, EXTENDED RELEASE ORAL DAILY
Qty: 90 TABLET | Refills: 1 | Status: SHIPPED | OUTPATIENT
Start: 2025-04-01

## 2025-04-10 ENCOUNTER — OFFICE VISIT (OUTPATIENT)
Dept: CARDIOLOGY | Facility: CLINIC | Age: 60
End: 2025-04-10
Payer: COMMERCIAL

## 2025-04-10 VITALS
BODY MASS INDEX: 31.28 KG/M2 | RESPIRATION RATE: 16 BRPM | WEIGHT: 218 LBS | DIASTOLIC BLOOD PRESSURE: 70 MMHG | OXYGEN SATURATION: 96 % | SYSTOLIC BLOOD PRESSURE: 130 MMHG | HEART RATE: 64 BPM

## 2025-04-10 DIAGNOSIS — E78.00 HYPERCHOLESTEREMIA: ICD-10-CM

## 2025-04-10 DIAGNOSIS — I10 PRIMARY HYPERTENSION: ICD-10-CM

## 2025-04-10 DIAGNOSIS — I25.10 ARTERIOSCLEROSIS OF CORONARY ARTERY: Primary | ICD-10-CM

## 2025-04-10 PROCEDURE — 99214 OFFICE O/P EST MOD 30 MIN: CPT | Performed by: INTERNAL MEDICINE

## 2025-04-10 PROCEDURE — 4010F ACE/ARB THERAPY RXD/TAKEN: CPT | Performed by: INTERNAL MEDICINE

## 2025-04-10 PROCEDURE — 3078F DIAST BP <80 MM HG: CPT | Performed by: INTERNAL MEDICINE

## 2025-04-10 PROCEDURE — 3052F HG A1C>EQUAL 8.0%<EQUAL 9.0%: CPT | Performed by: INTERNAL MEDICINE

## 2025-04-10 PROCEDURE — 1036F TOBACCO NON-USER: CPT | Performed by: INTERNAL MEDICINE

## 2025-04-10 PROCEDURE — 3075F SYST BP GE 130 - 139MM HG: CPT | Performed by: INTERNAL MEDICINE

## 2025-04-10 ASSESSMENT — LIFESTYLE VARIABLES
HOW MANY STANDARD DRINKS CONTAINING ALCOHOL DO YOU HAVE ON A TYPICAL DAY: PATIENT DOES NOT DRINK
AUDIT-C TOTAL SCORE: 0
SKIP TO QUESTIONS 9-10: 1
AUDIT TOTAL SCORE: 0
HOW OFTEN DO YOU HAVE SIX OR MORE DRINKS ON ONE OCCASION: NEVER
HAS A RELATIVE, FRIEND, DOCTOR, OR ANOTHER HEALTH PROFESSIONAL EXPRESSED CONCERN ABOUT YOUR DRINKING OR SUGGESTED YOU CUT DOWN: NO
HAVE YOU OR SOMEONE ELSE BEEN INJURED AS A RESULT OF YOUR DRINKING: NO
HOW OFTEN DO YOU HAVE A DRINK CONTAINING ALCOHOL: NEVER

## 2025-04-10 ASSESSMENT — ENCOUNTER SYMPTOMS
PND: 0
WEIGHT GAIN: 0
LOSS OF SENSATION IN FEET: 0
SYNCOPE: 0
WEAKNESS: 0
DEPRESSION: 0
COUGH: 0
DIZZINESS: 0
OCCASIONAL FEELINGS OF UNSTEADINESS: 0
PALPITATIONS: 0
SHORTNESS OF BREATH: 0
NEAR-SYNCOPE: 0
DYSPNEA ON EXERTION: 0
MYALGIAS: 0
WEIGHT LOSS: 0
FEVER: 0
CLAUDICATION: 0
ORTHOPNEA: 0
WHEEZING: 0
DIAPHORESIS: 0
IRREGULAR HEARTBEAT: 0

## 2025-04-10 ASSESSMENT — PATIENT HEALTH QUESTIONNAIRE - PHQ9
2. FEELING DOWN, DEPRESSED OR HOPELESS: NOT AT ALL
SUM OF ALL RESPONSES TO PHQ9 QUESTIONS 1 AND 2: 0
1. LITTLE INTEREST OR PLEASURE IN DOING THINGS: NOT AT ALL

## 2025-04-10 ASSESSMENT — PAIN SCALES - GENERAL: PAINLEVEL_OUTOF10: 0-NO PAIN

## 2025-04-10 NOTE — PROGRESS NOTES
Subjective      No chief complaint on file.       67-year-old male presents for cardiac evaluation. He has a h/o 4vCABG in 2019 at [facility]. His last ischemic eval was 5/2023 that was low risk but with a small foci of anterior reversibility per report--managed medically. He is not all that active. He denies functional limitations         Review of Systems   Constitutional: Negative for diaphoresis, fever, weight gain and weight loss.   Eyes:  Negative for visual disturbance.   Cardiovascular:  Negative for chest pain, claudication, dyspnea on exertion, irregular heartbeat, leg swelling, near-syncope, orthopnea, palpitations, paroxysmal nocturnal dyspnea and syncope.   Respiratory:  Negative for cough, shortness of breath and wheezing.    Musculoskeletal:  Negative for muscle weakness and myalgias.   Neurological:  Negative for dizziness and weakness.   All other systems reviewed and are negative.       Past Medical History:   Diagnosis Date    Depressed 09/17/2023    Hypertension 09/17/2023    Personal history of other diseases of the circulatory system     History of arterial occlusion    Tubular adenoma of colon 11/28/2023    Type 2 diabetes mellitus without complication (Multi) 09/17/2023        Past Surgical History:   Procedure Laterality Date    OTHER SURGICAL HISTORY  11/11/2019    Wrist surgery    OTHER SURGICAL HISTORY  12/02/2019    Coronary artery bypass graft        Social History     Socioeconomic History    Marital status:      Spouse name: Not on file    Number of children: Not on file    Years of education: Not on file    Highest education level: Not on file   Occupational History    Not on file   Tobacco Use    Smoking status: Never    Smokeless tobacco: Never   Substance and Sexual Activity    Alcohol use: Not Currently    Drug use: Never    Sexual activity: Not on file   Other Topics Concern    Not on file   Social History Narrative    Not on file     Social Drivers of Health     Financial  Resource Strain: Low Risk  (5/13/2024)    Overall Financial Resource Strain (CARDIA)     Difficulty of Paying Living Expenses: Not hard at all   Food Insecurity: No Food Insecurity (5/13/2024)    Hunger Vital Sign     Worried About Running Out of Food in the Last Year: Never true     Ran Out of Food in the Last Year: Never true   Transportation Needs: No Transportation Needs (5/13/2024)    PRAPARE - Transportation     Lack of Transportation (Medical): No     Lack of Transportation (Non-Medical): No   Physical Activity: Not on file   Stress: Stress Concern Present (5/13/2024)    [employer] - Occupational Stress Questionnaire     Feeling of Stress : Very much   Social Connections: Socially Isolated (5/13/2024)    Social Connection and Isolation Panel [NHANES]     Frequency of Communication with Friends and Family: Twice a week     Frequency of Social Gatherings with Friends and Family: Never     Attends Congregation Services: Never     Active Member of Clubs or Organizations: No     Attends Club or Organization Meetings: Never     Marital Status:    Intimate Partner Violence: Not At Risk (5/13/2024)    Humiliation, Afraid, Rape, and Kick questionnaire     Fear of Current or Ex-Partner: No     Emotionally Abused: No     Physically Abused: No     Sexually Abused: No   Housing Stability: Low Risk  (5/13/2024)    Housing Stability Vital Sign     Unable to Pay for Housing in the Last Year: No     Number of Places Lived in the Last Year: 1     Unstable Housing in the Last Year: No        Family History   Problem Relation Name Age of Onset    No Known Problems Mother      No Known Problems Sister      No Known Problems Brother      No Known Problems Brother      No Known Problems Son      No Known Problems Son      No Known Problems Other wife         OBJECTIVE:    There were no vitals filed for this visit.     Vitals reviewed.   Constitutional:       Appearance: Normal and healthy  appearance. Not in distress.   Pulmonary:      Effort: Pulmonary effort is normal.      Breath sounds: Normal breath sounds.   Cardiovascular:      Normal rate. Regular rhythm. Normal S1. Normal S2.       Murmurs: There is no murmur.      No gallop.  No click.   Pulses:     Intact distal pulses.   Edema:     Peripheral edema absent.   Skin:     General: Skin is warm and dry.   Neurological:      General: No focal deficit present.          Lab Review:   Lab Results   Component Value Date     05/13/2024     11/17/2023    K 5.0 05/13/2024    K 4.3 11/17/2023     05/13/2024     11/17/2023    CO2 26 05/13/2024    CO2 23 11/17/2023    BUN 18 05/13/2024    BUN 11 11/17/2023    CREATININE 0.80 05/13/2024    CREATININE 0.85 11/17/2023    GLUCOSE 161 (H) 05/13/2024    GLUCOSE 114 (A) 11/17/2023    CALCIUM 9.3 05/13/2024    CALCIUM 9.1 11/17/2023     Lab Results   Component Value Date    CHOL 134 05/14/2024    CHOL 98 11/17/2023    TRIG 177 (H) 05/14/2024    TRIG 80 11/17/2023    HDL 30.4 05/14/2024    HDL 36.0 11/17/2023       Lab Results   Component Value Date    LDLCALC 68 05/14/2024        No problem-specific Assessment & Plan notes found for this encounter.

## 2025-04-10 NOTE — ASSESSMENT & PLAN NOTE
Lipids per PCP. Goal LDL is <70. Continue atorvastatin. Lifestyle modifications were discussed. I advocated for mediterranean and plant based eating. We also discussed exercise. 150 minutes a week of moderate intensity exercise is recommended per our ACC/AHA guidelines

## 2025-04-28 PROCEDURE — RXMED WILLOW AMBULATORY MEDICATION CHARGE

## 2025-04-29 ENCOUNTER — PHARMACY VISIT (OUTPATIENT)
Dept: PHARMACY | Facility: CLINIC | Age: 60
End: 2025-04-29
Payer: COMMERCIAL

## 2025-04-29 PROCEDURE — RXMED WILLOW AMBULATORY MEDICATION CHARGE

## 2025-05-01 DIAGNOSIS — F32.89 OTHER SPECIFIED DEPRESSIVE DISORDER: ICD-10-CM

## 2025-05-01 DIAGNOSIS — F41.9 ANXIETY: ICD-10-CM

## 2025-05-01 PROCEDURE — RXMED WILLOW AMBULATORY MEDICATION CHARGE

## 2025-05-05 PROCEDURE — RXMED WILLOW AMBULATORY MEDICATION CHARGE

## 2025-05-05 RX ORDER — SERTRALINE HYDROCHLORIDE 100 MG/1
200 TABLET, FILM COATED ORAL DAILY
Qty: 180 TABLET | Refills: 1 | Status: SHIPPED | OUTPATIENT
Start: 2025-05-05 | End: 2025-08-06

## 2025-05-07 PROCEDURE — RXMED WILLOW AMBULATORY MEDICATION CHARGE

## 2025-05-08 ENCOUNTER — PHARMACY VISIT (OUTPATIENT)
Dept: PHARMACY | Facility: CLINIC | Age: 60
End: 2025-05-08
Payer: COMMERCIAL

## 2025-05-17 PROCEDURE — RXMED WILLOW AMBULATORY MEDICATION CHARGE

## 2025-05-20 ENCOUNTER — PHARMACY VISIT (OUTPATIENT)
Dept: PHARMACY | Facility: CLINIC | Age: 60
End: 2025-05-20
Payer: COMMERCIAL

## 2025-06-14 ENCOUNTER — PHARMACY VISIT (OUTPATIENT)
Dept: PHARMACY | Facility: CLINIC | Age: 60
End: 2025-06-14
Payer: COMMERCIAL

## 2025-06-14 PROCEDURE — RXMED WILLOW AMBULATORY MEDICATION CHARGE

## 2025-06-18 PROCEDURE — RXMED WILLOW AMBULATORY MEDICATION CHARGE

## 2025-06-27 PROCEDURE — RXMED WILLOW AMBULATORY MEDICATION CHARGE

## 2025-06-28 ENCOUNTER — PHARMACY VISIT (OUTPATIENT)
Dept: PHARMACY | Facility: CLINIC | Age: 60
End: 2025-06-28
Payer: COMMERCIAL

## 2025-07-25 PROCEDURE — RXMED WILLOW AMBULATORY MEDICATION CHARGE

## 2025-07-28 ENCOUNTER — PHARMACY VISIT (OUTPATIENT)
Dept: PHARMACY | Facility: CLINIC | Age: 60
End: 2025-07-28
Payer: COMMERCIAL

## 2025-08-10 PROCEDURE — RXMED WILLOW AMBULATORY MEDICATION CHARGE

## 2025-08-12 ENCOUNTER — PHARMACY VISIT (OUTPATIENT)
Dept: PHARMACY | Facility: CLINIC | Age: 60
End: 2025-08-12
Payer: COMMERCIAL

## 2025-08-12 PROCEDURE — RXMED WILLOW AMBULATORY MEDICATION CHARGE

## 2025-08-21 PROCEDURE — RXMED WILLOW AMBULATORY MEDICATION CHARGE

## 2025-08-25 ENCOUNTER — PHARMACY VISIT (OUTPATIENT)
Dept: PHARMACY | Facility: CLINIC | Age: 60
End: 2025-08-25
Payer: COMMERCIAL